# Patient Record
Sex: MALE | Employment: FULL TIME | URBAN - METROPOLITAN AREA
[De-identification: names, ages, dates, MRNs, and addresses within clinical notes are randomized per-mention and may not be internally consistent; named-entity substitution may affect disease eponyms.]

---

## 2017-11-25 ENCOUNTER — HOSPITAL ENCOUNTER (OUTPATIENT)
Age: 37
Discharge: HOME | End: 2017-11-25
Attending: FAMILY MEDICINE
Payer: COMMERCIAL

## 2017-11-25 VITALS
HEART RATE: 70 BPM | WEIGHT: 200 LBS | TEMPERATURE: 98 F | SYSTOLIC BLOOD PRESSURE: 130 MMHG | RESPIRATION RATE: 18 BRPM | DIASTOLIC BLOOD PRESSURE: 88 MMHG | OXYGEN SATURATION: 98 %

## 2017-11-25 DIAGNOSIS — J02.0 STREPTOCOCCAL SORE THROAT: Primary | ICD-10-CM

## 2017-11-25 PROCEDURE — 99214 OFFICE O/P EST MOD 30 MIN: CPT

## 2017-11-25 PROCEDURE — 87430 STREP A AG IA: CPT

## 2017-11-25 PROCEDURE — 99213 OFFICE O/P EST LOW 20 MIN: CPT

## 2017-11-25 RX ORDER — AMOXICILLIN 875 MG/1
875 TABLET, COATED ORAL 2 TIMES DAILY
Qty: 20 TABLET | Refills: 0 | Status: SHIPPED | OUTPATIENT
Start: 2017-11-25 | End: 2017-12-05

## 2017-11-25 NOTE — ED PROVIDER NOTES
Patient presents with:  Sore Throat      HPI:     Sneha Magdaleno is a 40year old male who presents with for chief complaint of nasal congestion, sore throat   X 2 days.     The patient denies complaints of fevers, chills, headache, neck pain, ear gretchen organomegaly    Skin: Skin color, texture, turgor normal. No rashes or lesions      Assessment/Plan:     Labs performed this visit:    Recent Results (from the past 10 hour(s))  -[facility] POCT RAPID STREP   Collection Time: 11/25/17  3:09 PM   Result Value Ref

## 2019-01-20 ENCOUNTER — HOSPITAL ENCOUNTER (OUTPATIENT)
Age: 39
Discharge: HOME | End: 2019-01-20
Attending: EMERGENCY MEDICINE
Payer: COMMERCIAL

## 2019-01-20 VITALS
SYSTOLIC BLOOD PRESSURE: 130 MMHG | TEMPERATURE: 99 F | DIASTOLIC BLOOD PRESSURE: 83 MMHG | OXYGEN SATURATION: 98 % | WEIGHT: 200 LBS | RESPIRATION RATE: 16 BRPM | HEART RATE: 104 BPM

## 2019-01-20 DIAGNOSIS — J02.0 STREP PHARYNGITIS: Primary | ICD-10-CM

## 2019-01-20 LAB — S PYO AG THROAT QL: POSITIVE

## 2019-01-20 PROCEDURE — 87430 STREP A AG IA: CPT

## 2019-01-20 PROCEDURE — 99214 OFFICE O/P EST MOD 30 MIN: CPT

## 2019-01-20 PROCEDURE — 99213 OFFICE O/P EST LOW 20 MIN: CPT

## 2019-01-20 RX ORDER — LOSARTAN POTASSIUM 50 MG/1
TABLET ORAL
COMMUNITY
Start: 2018-11-21

## 2019-01-20 RX ORDER — HYDROCHLOROTHIAZIDE 25 MG/1
TABLET ORAL
COMMUNITY
Start: 2018-11-21

## 2019-01-20 RX ORDER — AMOXICILLIN 875 MG/1
875 TABLET, COATED ORAL 2 TIMES DAILY
Qty: 20 TABLET | Refills: 0 | Status: SHIPPED | OUTPATIENT
Start: 2019-01-20 | End: 2019-01-30

## 2019-01-20 NOTE — ED PROVIDER NOTES
Patient Seen in: [facility] In [address] [place] [address]    History   Patient presents with:  Sore Throat    Stated Complaint: cough/sore throat/fever    HPI    Patient is a 25-year-old male who complains of sore throat and cough for the last 6 days normal. No respiratory distress. Abdominal: Soft. Bowel sounds are normal. Exhibits no distension and no mass. There is no tenderness. There is no rebound and no guarding. Musculoskeletal: Normal range of motion. Exhibits no edema or tenderness.    Lymp

## 2023-03-20 ENCOUNTER — APPOINTMENT (OUTPATIENT)
Dept: CT IMAGING | Facility: HOSPITAL | Age: 43
End: 2023-03-20
Attending: STUDENT IN AN ORGANIZED HEALTH CARE EDUCATION/TRAINING PROGRAM
Payer: COMMERCIAL

## 2023-03-20 ENCOUNTER — HOSPITAL ENCOUNTER (EMERGENCY)
Facility: HOSPITAL | Age: 43
Discharge: HOME | End: 2023-03-20
Attending: STUDENT IN AN ORGANIZED HEALTH CARE EDUCATION/TRAINING PROGRAM
Payer: COMMERCIAL

## 2023-03-20 VITALS
RESPIRATION RATE: 13 BRPM | OXYGEN SATURATION: 96 % | WEIGHT: 200 LBS | DIASTOLIC BLOOD PRESSURE: 133 MMHG | HEART RATE: 97 BPM | BODY MASS INDEX: 28 KG/M2 | HEIGHT: 71 IN | SYSTOLIC BLOOD PRESSURE: 188 MMHG | TEMPERATURE: 98 F

## 2023-03-20 DIAGNOSIS — R07.9 CHEST PAIN OF UNCERTAIN ETIOLOGY: Primary | ICD-10-CM

## 2023-03-20 LAB
ALBUMIN SERPL-MCNC: 4.8 G/DL (ref 3.4–5)
ALBUMIN/GLOB SERPL: 1.2 {RATIO} (ref 1–2)
ALP LIVER SERPL-CCNC: 68 U/L
ALT SERPL-CCNC: 87 U/L
ANION GAP SERPL CALC-SCNC: 10 MMOL/L (ref 0–18)
AST SERPL-CCNC: 40 U/L (ref 15–37)
ATRIAL RATE: 100 BPM
BASOPHILS # BLD AUTO: 0.05 X10(3) UL (ref 0–0.2)
BASOPHILS NFR BLD AUTO: 0.9 %
BILIRUB SERPL-MCNC: 1.1 MG/DL (ref 0.1–2)
BUN BLD-MCNC: 12 MG/DL (ref 7–18)
BUN/CREAT SERPL: 9.8 (ref 10–20)
CALCIUM BLD-MCNC: 9.8 MG/DL (ref 8.5–10.1)
CHLORIDE SERPL-SCNC: 104 MMOL/L (ref 98–112)
CO2 SERPL-SCNC: 26 MMOL/L (ref 21–32)
CREAT BLD-MCNC: 1.23 MG/DL
DEPRECATED RDW RBC AUTO: 38.7 FL (ref 35.1–46.3)
EOSINOPHIL # BLD AUTO: 0.11 X10(3) UL (ref 0–0.7)
EOSINOPHIL NFR BLD AUTO: 2 %
ERYTHROCYTE [DISTWIDTH] IN BLOOD BY AUTOMATED COUNT: 12.1 % (ref 11–15)
GFR SERPLBLD BASED ON 1.73 SQ M-ARVRAT: 75 ML/MIN/1.73M2 (ref 60–?)
GLOBULIN PLAS-MCNC: 3.9 G/DL (ref 2.8–4.4)
GLUCOSE BLD-MCNC: 132 MG/DL (ref 70–99)
HCT VFR BLD AUTO: 46.4 %
HGB BLD-MCNC: 16.1 G/DL
IMM GRANULOCYTES # BLD AUTO: 0.05 X10(3) UL (ref 0–1)
IMM GRANULOCYTES NFR BLD: 0.9 %
LYMPHOCYTES # BLD AUTO: 1.6 X10(3) UL (ref 1–4)
LYMPHOCYTES NFR BLD AUTO: 28.9 %
MCH RBC QN AUTO: 30.6 PG (ref 26–34)
MCHC RBC AUTO-ENTMCNC: 34.7 G/DL (ref 31–37)
MCV RBC AUTO: 88 FL
MONOCYTES # BLD AUTO: 0.54 X10(3) UL (ref 0.1–1)
MONOCYTES NFR BLD AUTO: 9.7 %
NEUTROPHILS # BLD AUTO: 3.19 X10 (3) UL (ref 1.5–7.7)
NEUTROPHILS # BLD AUTO: 3.19 X10(3) UL (ref 1.5–7.7)
NEUTROPHILS NFR BLD AUTO: 57.6 %
OSMOLALITY SERPL CALC.SUM OF ELEC: 292 MOSM/KG (ref 275–295)
P AXIS: 46 DEGREES
P-R INTERVAL: 166 MS
PLATELET # BLD AUTO: 181 10(3)UL (ref 150–450)
POTASSIUM SERPL-SCNC: 3.9 MMOL/L (ref 3.5–5.1)
PROT SERPL-MCNC: 8.7 G/DL (ref 6.4–8.2)
Q-T INTERVAL: 356 MS
QRS DURATION: 106 MS
QTC CALCULATION (BEZET): 459 MS
R AXIS: 46 DEGREES
RBC # BLD AUTO: 5.27 X10(6)UL
SODIUM SERPL-SCNC: 140 MMOL/L (ref 136–145)
T AXIS: -1 DEGREES
TROPONIN I HIGH SENSITIVITY: 10 NG/L
VENTRICULAR RATE: 100 BPM
WBC # BLD AUTO: 5.5 X10(3) UL (ref 4–11)

## 2023-03-20 PROCEDURE — 93005 ELECTROCARDIOGRAM TRACING: CPT

## 2023-03-20 PROCEDURE — 71275 CT ANGIOGRAPHY CHEST: CPT | Performed by: STUDENT IN AN ORGANIZED HEALTH CARE EDUCATION/TRAINING PROGRAM

## 2023-03-20 PROCEDURE — 84484 ASSAY OF TROPONIN QUANT: CPT | Performed by: STUDENT IN AN ORGANIZED HEALTH CARE EDUCATION/TRAINING PROGRAM

## 2023-03-20 PROCEDURE — 99285 EMERGENCY DEPT VISIT HI MDM: CPT

## 2023-03-20 PROCEDURE — 93010 ELECTROCARDIOGRAM REPORT: CPT

## 2023-03-20 PROCEDURE — 74175 CTA ABDOMEN W/CONTRAST: CPT | Performed by: STUDENT IN AN ORGANIZED HEALTH CARE EDUCATION/TRAINING PROGRAM

## 2023-03-20 PROCEDURE — 85025 COMPLETE CBC W/AUTO DIFF WBC: CPT | Performed by: STUDENT IN AN ORGANIZED HEALTH CARE EDUCATION/TRAINING PROGRAM

## 2023-03-20 PROCEDURE — 80053 COMPREHEN METABOLIC PANEL: CPT | Performed by: STUDENT IN AN ORGANIZED HEALTH CARE EDUCATION/TRAINING PROGRAM

## 2023-03-20 PROCEDURE — 99284 EMERGENCY DEPT VISIT MOD MDM: CPT

## 2023-03-20 PROCEDURE — 36415 COLL VENOUS BLD VENIPUNCTURE: CPT

## 2023-03-20 RX ORDER — AMLODIPINE BESYLATE 5 MG/1
5 TABLET ORAL DAILY
Qty: 30 TABLET | Refills: 0 | Status: SHIPPED | OUTPATIENT
Start: 2023-03-20 | End: 2023-04-19

## 2023-03-20 NOTE — ED INITIAL ASSESSMENT (HPI)
Patient ambulatory to ED with complaint of left sided chest pain that radiates to arm that started yesterday. Describes as dull pain. worsening with inspiration. Patient is AXOX4.

## 2023-03-20 NOTE — DISCHARGE INSTRUCTIONS
Please return to the emergency department if you develop severe and persistent chest pain,  difficulty breathing, dizziness, leg swelling or if you are coughing up blood as these can be signs  of a medical emergency. Please call your doctor for a follow up appointment in 2-3 days to  determine the need for further testing.

## 2023-03-21 ENCOUNTER — HOSPITAL ENCOUNTER (EMERGENCY)
Facility: HOSPITAL | Age: 43
Discharge: HOME | End: 2023-03-21
Attending: EMERGENCY MEDICINE
Payer: COMMERCIAL

## 2023-03-21 VITALS
OXYGEN SATURATION: 96 % | WEIGHT: 200 LBS | HEART RATE: 105 BPM | BODY MASS INDEX: 27.69 KG/M2 | DIASTOLIC BLOOD PRESSURE: 121 MMHG | RESPIRATION RATE: 17 BRPM | SYSTOLIC BLOOD PRESSURE: 164 MMHG | TEMPERATURE: 98 F | HEIGHT: 71.26 IN

## 2023-03-21 DIAGNOSIS — I10 HYPERTENSION, UNSPECIFIED TYPE: ICD-10-CM

## 2023-03-21 DIAGNOSIS — R07.89 CHEST PAIN, ATYPICAL: Primary | ICD-10-CM

## 2023-03-21 LAB
ATRIAL RATE: 119 BPM
CK SERPL-CCNC: 159 U/L
P AXIS: 22 DEGREES
P-R INTERVAL: 164 MS
Q-T INTERVAL: 342 MS
QRS DURATION: 100 MS
QTC CALCULATION (BEZET): 481 MS
R AXIS: 39 DEGREES
T AXIS: -1 DEGREES
TROPONIN I HIGH SENSITIVITY: 9 NG/L
TSI SER-ACNC: 1.08 MIU/ML (ref 0.36–3.74)
VENTRICULAR RATE: 119 BPM

## 2023-03-21 PROCEDURE — 99284 EMERGENCY DEPT VISIT MOD MDM: CPT

## 2023-03-21 PROCEDURE — 36415 COLL VENOUS BLD VENIPUNCTURE: CPT

## 2023-03-21 PROCEDURE — 93010 ELECTROCARDIOGRAM REPORT: CPT

## 2023-03-21 PROCEDURE — 83835 ASSAY OF METANEPHRINES: CPT | Performed by: EMERGENCY MEDICINE

## 2023-03-21 PROCEDURE — 84443 ASSAY THYROID STIM HORMONE: CPT | Performed by: EMERGENCY MEDICINE

## 2023-03-21 PROCEDURE — 84484 ASSAY OF TROPONIN QUANT: CPT | Performed by: EMERGENCY MEDICINE

## 2023-03-21 PROCEDURE — 82550 ASSAY OF CK (CPK): CPT | Performed by: EMERGENCY MEDICINE

## 2023-03-21 PROCEDURE — 93005 ELECTROCARDIOGRAM TRACING: CPT

## 2023-03-21 RX ORDER — DIAZEPAM 5 MG/ML
2 INJECTION, SOLUTION INTRAMUSCULAR; INTRAVENOUS ONCE
Status: DISCONTINUED | OUTPATIENT
Start: 2023-03-21 | End: 2023-03-21

## 2023-03-21 RX ORDER — HYDROCHLOROTHIAZIDE 25 MG/1
25 TABLET ORAL ONCE
Status: DISCONTINUED | OUTPATIENT
Start: 2023-03-21 | End: 2023-03-21

## 2023-03-21 RX ORDER — HYDROCHLOROTHIAZIDE 25 MG/1
25 TABLET ORAL DAILY
Qty: 30 TABLET | Refills: 0 | Status: SHIPPED | OUTPATIENT
Start: 2023-03-21 | End: 2023-04-20

## 2023-03-21 NOTE — DISCHARGE INSTRUCTIONS
Take medications as prescribed/scheduled - followup tomorrow as scheduled. Seek care for worsening symptoms.

## 2023-03-21 NOTE — ED INITIAL ASSESSMENT (HPI)
Pt was seen yesterday for CP and BP issues; was sent home. However today states BP on the rise again and slight palpations. Pt took BP meds that were prescribed from yesterday.  BP continues to be high and slight pressure in the chest.

## 2023-03-24 LAB
CREATININE, URINE - PER VOLUME: 77 MG/DL
METANEPHRINE, UR- RATIO TO CRT: 81 UG/G CRT
METANEPHRINE, URINE-PER VOLUME: 62 UG/L
METANEPHRINE: 0.4 NMOL/L
NORMETANEPHRINE, UR-PER VOLUME: 121 UG/L
NORMETANEPHRINE, URINE - RATIO: 157 UG/G CRT
NORMETANEPHRINE: 0.81 NMOL/L

## 2024-03-19 ENCOUNTER — APPOINTMENT (OUTPATIENT)
Dept: CT IMAGING | Age: 44
End: 2024-03-19
Attending: Physician Assistant
Payer: COMMERCIAL

## 2024-03-19 ENCOUNTER — HOSPITAL ENCOUNTER (INPATIENT)
Facility: HOSPITAL | Age: 44
LOS: 4 days | Discharge: HOME | End: 2024-03-25
Attending: HOSPITALIST | Admitting: HOSPITALIST
Payer: COMMERCIAL

## 2024-03-19 ENCOUNTER — HOSPITAL ENCOUNTER (OUTPATIENT)
Age: 44
Discharge: ANOTHER HOSPITAL | End: 2024-03-19
Payer: COMMERCIAL

## 2024-03-19 VITALS
SYSTOLIC BLOOD PRESSURE: 136 MMHG | TEMPERATURE: 99 F | HEART RATE: 110 BPM | DIASTOLIC BLOOD PRESSURE: 95 MMHG | OXYGEN SATURATION: 99 % | RESPIRATION RATE: 18 BRPM

## 2024-03-19 DIAGNOSIS — K37 APPENDICITIS: ICD-10-CM

## 2024-03-19 DIAGNOSIS — K35.30 ACUTE APPENDICITIS WITH LOCALIZED PERITONITIS, WITHOUT PERFORATION, ABSCESS, OR GANGRENE: Primary | ICD-10-CM

## 2024-03-19 DIAGNOSIS — K35.80 ACUTE APPENDICITIS, UNSPECIFIED ACUTE APPENDICITIS TYPE: Primary | ICD-10-CM

## 2024-03-19 LAB
#MXD IC: 1.6 X10ˆ3/UL (ref 0.1–1)
BILIRUB UR QL STRIP: NEGATIVE
BUN BLD-MCNC: 14 MG/DL (ref 7–18)
CHLORIDE BLD-SCNC: 96 MMOL/L (ref 98–112)
CLARITY UR: CLEAR
CO2 BLD-SCNC: 23 MMOL/L (ref 21–32)
COLOR UR: YELLOW
CREAT BLD-MCNC: 1.2 MG/DL
EGFRCR SERPLBLD CKD-EPI 2021: 77 ML/MIN/1.73M2 (ref 60–?)
GLUCOSE BLD-MCNC: 122 MG/DL (ref 70–99)
GLUCOSE UR STRIP-MCNC: NEGATIVE MG/DL
HCT VFR BLD AUTO: 43.6 %
HCT VFR BLD CALC: 50 %
HGB BLD-MCNC: 15.3 G/DL
HGB UR QL STRIP: NEGATIVE
ISTAT IONIZED CALCIUM FOR CHEM 8: 1.21 MMOL/L (ref 1.12–1.32)
KETONES UR STRIP-MCNC: NEGATIVE MG/DL
LEUKOCYTE ESTERASE UR QL STRIP: NEGATIVE
LYMPHOCYTES # BLD AUTO: 2.1 X10ˆ3/UL (ref 1–4)
LYMPHOCYTES NFR BLD AUTO: 9 %
MCH RBC QN AUTO: 30.1 PG (ref 26–34)
MCHC RBC AUTO-ENTMCNC: 35.1 G/DL (ref 31–37)
MCV RBC AUTO: 85.8 FL (ref 80–100)
MIXED CELL %: 7 %
NEUTROPHILS # BLD AUTO: 19.6 X10ˆ3/UL (ref 1.5–7.7)
NEUTROPHILS NFR BLD AUTO: 84 %
NITRITE UR QL STRIP: NEGATIVE
PH UR STRIP: 5.5 [PH]
PLATELET # BLD AUTO: 203 X10ˆ3/UL (ref 150–450)
POTASSIUM BLD-SCNC: 3.7 MMOL/L (ref 3.6–5.1)
PROT UR STRIP-MCNC: NEGATIVE MG/DL
RBC # BLD AUTO: 5.08 X10ˆ6/UL
SODIUM BLD-SCNC: 133 MMOL/L (ref 136–145)
SP GR UR STRIP: <=1.005
UROBILINOGEN UR STRIP-ACNC: <2 MG/DL
WBC # BLD AUTO: 23.3 X10ˆ3/UL (ref 4–11)

## 2024-03-19 PROCEDURE — 74177 CT ABD & PELVIS W/CONTRAST: CPT | Performed by: PHYSICIAN ASSISTANT

## 2024-03-19 PROCEDURE — 96375 TX/PRO/DX INJ NEW DRUG ADDON: CPT

## 2024-03-19 PROCEDURE — 99285 EMERGENCY DEPT VISIT HI MDM: CPT

## 2024-03-19 PROCEDURE — 96365 THER/PROPH/DIAG IV INF INIT: CPT

## 2024-03-19 RX ORDER — SODIUM CHLORIDE 9 MG/ML
INJECTION, SOLUTION INTRAVENOUS ONCE
Status: COMPLETED | OUTPATIENT
Start: 2024-03-19 | End: 2024-03-20

## 2024-03-19 RX ORDER — LISINOPRIL 40 MG/1
40 TABLET ORAL DAILY
COMMUNITY
Start: 2024-02-27

## 2024-03-19 RX ORDER — SODIUM CHLORIDE 9 MG/ML
INJECTION, SOLUTION INTRAVENOUS CONTINUOUS
Status: ACTIVE | OUTPATIENT
Start: 2024-03-19 | End: 2024-03-20

## 2024-03-19 RX ORDER — AMLODIPINE BESYLATE 5 MG/1
10 TABLET ORAL DAILY
COMMUNITY
Start: 2023-12-13

## 2024-03-19 RX ORDER — MORPHINE SULFATE 2 MG/ML
1 INJECTION, SOLUTION INTRAMUSCULAR; INTRAVENOUS EVERY 2 HOUR PRN
Status: DISCONTINUED | OUTPATIENT
Start: 2024-03-19 | End: 2024-03-23

## 2024-03-19 RX ORDER — AMLODIPINE BESYLATE 10 MG/1
10 TABLET ORAL DAILY
Status: DISCONTINUED | OUTPATIENT
Start: 2024-03-20 | End: 2024-03-25

## 2024-03-19 RX ORDER — BISACODYL 10 MG
10 SUPPOSITORY, RECTAL RECTAL
Status: DISCONTINUED | OUTPATIENT
Start: 2024-03-19 | End: 2024-03-25

## 2024-03-19 RX ORDER — SODIUM CHLORIDE 9 MG/ML
INJECTION, SOLUTION INTRAVENOUS CONTINUOUS
Status: DISCONTINUED | OUTPATIENT
Start: 2024-03-19 | End: 2024-03-23

## 2024-03-19 RX ORDER — MORPHINE SULFATE 4 MG/ML
4 INJECTION, SOLUTION INTRAMUSCULAR; INTRAVENOUS ONCE
Status: COMPLETED | OUTPATIENT
Start: 2024-03-19 | End: 2024-03-19

## 2024-03-19 RX ORDER — ONDANSETRON 2 MG/ML
4 INJECTION INTRAMUSCULAR; INTRAVENOUS EVERY 6 HOURS PRN
Status: DISCONTINUED | OUTPATIENT
Start: 2024-03-19 | End: 2024-03-25

## 2024-03-19 RX ORDER — METRONIDAZOLE 500 MG/100ML
500 INJECTION, SOLUTION INTRAVENOUS ONCE
Status: COMPLETED | OUTPATIENT
Start: 2024-03-19 | End: 2024-03-20

## 2024-03-19 RX ORDER — MORPHINE SULFATE 2 MG/ML
2 INJECTION, SOLUTION INTRAMUSCULAR; INTRAVENOUS EVERY 2 HOUR PRN
Status: DISCONTINUED | OUTPATIENT
Start: 2024-03-19 | End: 2024-03-23

## 2024-03-19 RX ORDER — POLYETHYLENE GLYCOL 3350 17 G/17G
17 POWDER, FOR SOLUTION ORAL DAILY PRN
Status: DISCONTINUED | OUTPATIENT
Start: 2024-03-19 | End: 2024-03-25

## 2024-03-19 RX ORDER — MORPHINE SULFATE 4 MG/ML
4 INJECTION, SOLUTION INTRAMUSCULAR; INTRAVENOUS EVERY 2 HOUR PRN
Status: DISCONTINUED | OUTPATIENT
Start: 2024-03-19 | End: 2024-03-23

## 2024-03-19 RX ORDER — SENNOSIDES 8.6 MG
17.2 TABLET ORAL NIGHTLY PRN
Status: DISCONTINUED | OUTPATIENT
Start: 2024-03-19 | End: 2024-03-25

## 2024-03-19 RX ORDER — PROCHLORPERAZINE EDISYLATE 5 MG/ML
5 INJECTION INTRAMUSCULAR; INTRAVENOUS EVERY 8 HOURS PRN
Status: DISCONTINUED | OUTPATIENT
Start: 2024-03-19 | End: 2024-03-21

## 2024-03-19 RX ORDER — METRONIDAZOLE 500 MG/100ML
500 INJECTION, SOLUTION INTRAVENOUS EVERY 8 HOURS
Status: DISCONTINUED | OUTPATIENT
Start: 2024-03-20 | End: 2024-03-22

## 2024-03-19 RX ORDER — ACETAMINOPHEN 500 MG
500 TABLET ORAL EVERY 4 HOURS PRN
Status: DISCONTINUED | OUTPATIENT
Start: 2024-03-19 | End: 2024-03-23

## 2024-03-19 NOTE — ED PROVIDER NOTES
Chief Complaint   Patient presents with    Abdomen/Flank Pain     History obtained from: patient   services not used     HPI:     Gómez Sinclair is a 44 year old male who presents with abdominal pain x 2 days.  Patient describes pain as constant localized to middle and lower abdomen without radiation.  Patient notes decreased appetite but continues to drink fluids.  Patient had a loose stool 2 days ago and has not had a bowel movement since.  Denies fever, chills, nausea, vomiting, diarrhea, constipation, blood in stool, urinary symptoms, flank pain.  Denies recent travel or sick contacts.  Remote history of hernia repair, otherwise no history of abdominal surgeries.    PMH  Past Medical History:   Diagnosis Date    Essential hypertension     Hyperlipidemia        PFSH    PFSH asessment screens reviewed and agree.  Nurses notes reviewed I agree with documentation.    No family history on file.  Family history reviewed with patient/caregiver and is not pertinent to presenting problem.  Social History     Socioeconomic History    Marital status:      Spouse name: Not on file    Number of children: Not on file    Years of education: Not on file    Highest education level: Not on file   Occupational History    Not on file   Tobacco Use    Smoking status: Never    Smokeless tobacco: Never   Substance and Sexual Activity    Alcohol use: Not on file    Drug use: Not on file    Sexual activity: Not on file   Other Topics Concern    Not on file   Social History Narrative    Not on file     Social Determinants of Health     Financial Resource Strain: Not on file   Food Insecurity: Not on file   Transportation Needs: Not on file   Physical Activity: Not on file   Stress: Not on file   Social Connections: Not on file   Housing Stability: Not on file         ROS:   Positive for stated complaint: Abdominal pain  All other systems reviewed and negative except as noted above.  Constitutional and Vital Signs  Reviewed.    Physical Exam:     Findings:    BP (!) 136/95   Pulse 110   Temp 99 °F (37.2 °C) (Temporal)   Resp 18   SpO2 99%   GENERAL: well developed, no acute distress, non-toxic appearing   SKIN: good skin turgor, no obvious rashes  HEAD: normocephalic, atraumatic  EYES: sclera non-icteric bilaterally, conjunctiva clear bilaterally  OROPHARYNX: MMM, pharynx clear, maintaining airway and secretions  NECK: no nuchal rigidity, no trismus, no edema, phonation normal    CARDIO: tachycardic, regular rhythm, normal heart sounds   LUNGS: clear to auscultation bilaterally, no increased WOB, no rales, rhonchi, or wheezes  GI: normoactive bowel sounds, abdomen soft, periumbilical and RLQ tenderness, no rebound tenderness or guarding   EXTREMITIES: no cyanosis or edema, LIANG without difficulty  NEURO: no focal deficits  PSYCH: alert and oriented x3, answering questions appropriately, mood appropriate    MDM/Assessment/Plan:   Orders for this encounter:    Orders Placed This Encounter    CT ABDOMEN+PELVIS(CONTRAST ONLY)(CPT=74177)     abd pain Pt presents to the IC with c/o        Order Specific Question:   If clinically indicated, CT Protocol includes Oral Contrast. Can Oral Contrast be administered?     Answer:   Yes     Order Specific Question:   What is the Relevant Clinical Indication / Reason for Exam?     Answer:   RLQ abdominal pain     Order Specific Question:   Release to patient     Answer:   Immediate    POCT CBC     Order Specific Question:   Release to patient     Answer:   Immediate    POCT Urinalysis Dipstick    POCT ISTAT chem8 cartridge    iStat (Chem 8)    POCT Urine Dip    Insert Peripheral IV    iopamidol 76% (ISOVUE-370) injection for power injector       Labs performed this visit:  Recent Results (from the past 10 hour(s))   POCT CBC    Collection Time: 03/19/24  5:24 PM   Result Value Ref Range    WBC IC 23.3 (H) 4.0 - 11.0 x10ˆ3/uL    RBC IC 5.08 4.30 - 5.70 X10ˆ6/uL    HGB IC 15.3 13.0 - 17.5  g/dL    HCT IC 43.6 39.0 - 53.0 %    MCV IC 85.8 80.0 - 100.0 fL    MCH IC 30.1 26.0 - 34.0 pg    MCHC IC 35.1 31.0 - 37.0 g/dL    PLT .0 150.0 - 450.0 X10ˆ3/uL    # Neutrophil 19.6 (H) 1.5 - 7.7 X10ˆ3/uL    # Lymphocyte 2.1 1.0 - 4.0 X10ˆ3/uL    # Mixed Cells 1.6 (H) 0.1 - 1.0 X10ˆ3/uL    Neutrophil % 84.0 %    Lymphocyte % 9.0 %    Mixed Cell % 7.0 %   POCT Urinalysis Dipstick    Collection Time: 03/19/24  5:27 PM   Result Value Ref Range    Urine Color Yellow Yellow    Urine Clarity Clear Clear    Specific Gravity, Urine <=1.005 1.005 - 1.030    PH, Urine 5.5 5.0 - 8.0    Protein urine Negative Negative mg/dL    Glucose, Urine Negative Negative mg/dL    Ketone, Urine Negative Negative mg/dL    Bilirubin, Urine Negative Negative    Blood, Urine Negative Negative    Nitrite Urine Negative Negative    Urobilinogen urine <2.0 <2.0 mg/dL    Leukocyte esterase urine Negative Negative   POCT ISTAT chem8 cartridge    Collection Time: 03/19/24  5:33 PM   Result Value Ref Range    ISTAT Sodium 133 (L) 136 - 145 mmol/L    ISTAT BUN 14 7 - 18 mg/dL    ISTAT Potassium 3.7 3.6 - 5.1 mmol/L    ISTAT Chloride 96 (L) 98 - 112 mmol/L    ISTAT Ionized Calcium 1.21 1.12 - 1.32 mmol/L    ISTAT Hematocrit 50 37 - 53 %    ISTAT Glucose 122 (H) 70 - 99 mg/dL    ISTAT TCO2 23 21 - 32 mmol/L    ISTAT Creatinine 1.20 0.70 - 1.30 mg/dL    eGFR-Cr 77 >=60 mL/min/1.73m2       Imaging performed this visit:  CT ABDOMEN+PELVIS(CONTRAST ONLY)(CPT=74177)   Final Result   PROCEDURE: CT ABDOMEN + PELVIS (CONTRAST ONLY) (CPT=74177)       COMPARISON: [facility], CTA CHEST+CTA ABDOMEN DISSECT SET    (CPT=71275/72539), 3/20/2023, 9:22 AM.       INDICATIONS: RLQ abdominal pain       TECHNIQUE: CT images of the abdomen and pelvis were obtained with    non-ionic intravenous contrast material.  Automated exposure control for    dose reduction was used. Adjustment of the mA and/or kV was done based on    the patient's size. Use of  iterative    reconstruction technique for dose reduction was used.  Dose information is    transmitted to the ACR (American College of Radiology) NRDR (National    Radiology Data Registry) which includes the Dose Index Registry.       FINDINGS:    LOWER CHEST: Minimal bibasilar atelectasis.  No coronary artery    calcifications.  The heart is within normal limits of size.  No    pericardial effusion.       HEPATOBILIARY:   There is hepatic steatosis.  The liver is unchanged in    size.  No focal hepatic lesion.  No acute cholecystitis.  No intrahepatic    or extrahepatic biliary ductal dilatation.       SPLEEN:   No enlargement or focal lesion.         PANCREAS:   No lesion, fluid collection, ductal dilatation, or atrophy.         ADRENALS:   No mass or enlargement.         GENITOURINARY:   No hydronephrosis or urinary calculus.  There is fat    stranding adjacent to the right mid distal ureter.  No enhancing renal    mass.  Multiple bilateral renal cysts.  The bladder is unremarkable.       GI TRACT:    Acute uncomplicated appendicitis.  The appendix is dilated    and measure up to 1.7 cm in diameter (2:95).  There is a 10 mm    appendicolith in the appendiceal base (2:94).  There is diffuse fat    stranding surrounding the appendix.  No drainable    fluid collection.  No pneumoperitoneum.  No bowel obstruction.  No    abnormal small or large bowel wall thickening.  There is colonic    diverticulosis without evidence of acute diverticulitis.       PELVIC ORGANS: The prostate measures 4.3 cm in the transverse plane.       AORTA/VASCULAR:   No aneurysm or dissection.        RETROPERITONEUM:   No mass or enlarged adenopathy.         PERITONEUM: Small volume of free fluid in the pelvis (2:113).       LYMPH NODES: No evidence of lymphadenopathy.  There are few nonenlarged    right lower quadrant lymph nodes, which are likely reactive.       BONES:  No acute fracture. No aggressive osseous lesion.  Grade 1     anterolisthesis of L5 on S1.  There are bilateral L5 pars defects.  Mild    degenerative change of the lower lumbar spine.       OTHER:   Negative.                         =====   CONCLUSION:        1. Acute uncomplicated appendicitis.  Recommend surgical consultation.   2. No bowel obstruction, drainable fluid collection, or pneumoperitoneum.   3. Small volume of free fluid in the pelvis, which is likely secondary to    above.   4. Hepatic steatosis.   5. Mild prostatomegaly.   6. Small hiatal hernia.   7. Bilateral L5 pars defects with associated grade 1 anterolisthesis of L5    on S1 and lower lumbar predominant degenerative change.   8. Lesser incidental findings described above.                 Dictated by (CST): Home Kingston MD on 3/19/2024 at 6:43 PM        Finalized by (CST): Home Kingston MD on 3/19/2024 at 6:48 PM                   Medical Decision Making  DDx includes gastritis versus appendicitis versus constipation versus bowel obstruction versus diverticulitis versus other.  Patient is overall well-appearing, tachycardic however states he is feeling anxious about being at the doctor.  Vital signs otherwise stable.  CBC reviewed, notable for leukocytosis of 23.3 and neutrophilia of 19.6.  BMP reviewed, notable for mild hyponatremia of 133, otherwise grossly unremarkable without further electrolyte derangements or kidney dysfunction.  Urine dipstick analysis reviewed, grossly unremarkable without evidence of infection or blood.  Patient sent to [facility] for CT abdomen pelvis with contrast.    1850: CT reviewed, acute appendicitis noted, no bowel obstruction, small free fluid in the pelvis, other incidental findings as per above.  Discussed these results with patient via phone.  Discussed case with [facility] and [facility] transfer center, no beds available for direct admission.  Instructed patient to go directly to [facility].  Patient verbalizes understanding and declines EMS  transport.  IV in place to left AC.    Discussed case with Dr. Dietrich who is in agreement with assessment and plan.  Discussed case with [facility] charge RN who is aware of patient coming.           Diagnosis:    ICD-10-CM    1. Acute appendicitis, unspecified acute appendicitis type  K35.80             Becky Loja PA-C

## 2024-03-20 ENCOUNTER — ANESTHESIA EVENT (OUTPATIENT)
Dept: SURGERY | Facility: HOSPITAL | Age: 44
End: 2024-03-20
Payer: COMMERCIAL

## 2024-03-20 ENCOUNTER — ANESTHESIA (OUTPATIENT)
Dept: SURGERY | Facility: HOSPITAL | Age: 44
End: 2024-03-20
Payer: COMMERCIAL

## 2024-03-20 LAB
ANION GAP SERPL CALC-SCNC: 7 MMOL/L (ref 0–18)
BASOPHILS # BLD AUTO: 0.05 X10(3) UL (ref 0–0.2)
BASOPHILS NFR BLD AUTO: 0.2 %
BUN BLD-MCNC: 12 MG/DL (ref 9–23)
BUN/CREAT SERPL: 10.4 (ref 10–20)
CALCIUM BLD-MCNC: 9.6 MG/DL (ref 8.7–10.4)
CHLORIDE SERPL-SCNC: 102 MMOL/L (ref 98–112)
CO2 SERPL-SCNC: 24 MMOL/L (ref 21–32)
CREAT BLD-MCNC: 1.15 MG/DL
DEPRECATED RDW RBC AUTO: 39.8 FL (ref 35.1–46.3)
EGFRCR SERPLBLD CKD-EPI 2021: 81 ML/MIN/1.73M2 (ref 60–?)
EOSINOPHIL # BLD AUTO: 0.01 X10(3) UL (ref 0–0.7)
EOSINOPHIL NFR BLD AUTO: 0 %
ERYTHROCYTE [DISTWIDTH] IN BLOOD BY AUTOMATED COUNT: 12.3 % (ref 11–15)
GLUCOSE BLD-MCNC: 118 MG/DL (ref 70–99)
HCT VFR BLD AUTO: 43.8 %
HGB BLD-MCNC: 14.7 G/DL
IMM GRANULOCYTES # BLD AUTO: 0.23 X10(3) UL (ref 0–1)
IMM GRANULOCYTES NFR BLD: 0.9 %
LACTATE SERPL-SCNC: 1.5 MMOL/L (ref 0.5–2)
LYMPHOCYTES # BLD AUTO: 1.18 X10(3) UL (ref 1–4)
LYMPHOCYTES NFR BLD AUTO: 4.8 %
MCH RBC QN AUTO: 29.3 PG (ref 26–34)
MCHC RBC AUTO-ENTMCNC: 33.6 G/DL (ref 31–37)
MCV RBC AUTO: 87.4 FL
MONOCYTES # BLD AUTO: 1.93 X10(3) UL (ref 0.1–1)
MONOCYTES NFR BLD AUTO: 7.8 %
NEUTROPHILS # BLD AUTO: 21.27 X10 (3) UL (ref 1.5–7.7)
NEUTROPHILS # BLD AUTO: 21.27 X10(3) UL (ref 1.5–7.7)
NEUTROPHILS NFR BLD AUTO: 86.3 %
OSMOLALITY SERPL CALC.SUM OF ELEC: 277 MOSM/KG (ref 275–295)
PLATELET # BLD AUTO: 215 10(3)UL (ref 150–450)
POTASSIUM SERPL-SCNC: 3.9 MMOL/L (ref 3.5–5.1)
RBC # BLD AUTO: 5.01 X10(6)UL
SODIUM SERPL-SCNC: 133 MMOL/L (ref 136–145)
WBC # BLD AUTO: 24.7 X10(3) UL (ref 4–11)

## 2024-03-20 PROCEDURE — 87205 SMEAR GRAM STAIN: CPT | Performed by: COLON & RECTAL SURGERY

## 2024-03-20 PROCEDURE — 80048 BASIC METABOLIC PNL TOTAL CA: CPT | Performed by: HOSPITALIST

## 2024-03-20 PROCEDURE — 87077 CULTURE AEROBIC IDENTIFY: CPT | Performed by: COLON & RECTAL SURGERY

## 2024-03-20 PROCEDURE — 87040 BLOOD CULTURE FOR BACTERIA: CPT | Performed by: HOSPITALIST

## 2024-03-20 PROCEDURE — 85025 COMPLETE CBC W/AUTO DIFF WBC: CPT | Performed by: HOSPITALIST

## 2024-03-20 PROCEDURE — 87070 CULTURE OTHR SPECIMN AEROBIC: CPT | Performed by: COLON & RECTAL SURGERY

## 2024-03-20 PROCEDURE — 0DTJ4ZZ RESECTION OF APPENDIX, PERCUTANEOUS ENDOSCOPIC APPROACH: ICD-10-PCS | Performed by: COLON & RECTAL SURGERY

## 2024-03-20 PROCEDURE — 83605 ASSAY OF LACTIC ACID: CPT | Performed by: HOSPITALIST

## 2024-03-20 PROCEDURE — 96376 TX/PRO/DX INJ SAME DRUG ADON: CPT

## 2024-03-20 PROCEDURE — 88304 TISSUE EXAM BY PATHOLOGIST: CPT | Performed by: COLON & RECTAL SURGERY

## 2024-03-20 RX ORDER — DEXAMETHASONE SODIUM PHOSPHATE 4 MG/ML
VIAL (ML) INJECTION AS NEEDED
Status: DISCONTINUED | OUTPATIENT
Start: 2024-03-20 | End: 2024-03-20 | Stop reason: SURG

## 2024-03-20 RX ORDER — LIDOCAINE HYDROCHLORIDE 10 MG/ML
INJECTION, SOLUTION EPIDURAL; INFILTRATION; INTRACAUDAL; PERINEURAL AS NEEDED
Status: DISCONTINUED | OUTPATIENT
Start: 2024-03-20 | End: 2024-03-20 | Stop reason: SURG

## 2024-03-20 RX ORDER — MORPHINE SULFATE 4 MG/ML
2 INJECTION, SOLUTION INTRAMUSCULAR; INTRAVENOUS EVERY 10 MIN PRN
Status: DISCONTINUED | OUTPATIENT
Start: 2024-03-20 | End: 2024-03-20 | Stop reason: HOSPADM

## 2024-03-20 RX ORDER — BUPIVACAINE HYDROCHLORIDE 2.5 MG/ML
INJECTION, SOLUTION EPIDURAL; INFILTRATION; INTRACAUDAL AS NEEDED
Status: DISCONTINUED | OUTPATIENT
Start: 2024-03-20 | End: 2024-03-20 | Stop reason: HOSPADM

## 2024-03-20 RX ORDER — HYDROMORPHONE HYDROCHLORIDE 1 MG/ML
0.2 INJECTION, SOLUTION INTRAMUSCULAR; INTRAVENOUS; SUBCUTANEOUS EVERY 5 MIN PRN
Status: DISCONTINUED | OUTPATIENT
Start: 2024-03-20 | End: 2024-03-20 | Stop reason: HOSPADM

## 2024-03-20 RX ORDER — MIDAZOLAM HYDROCHLORIDE 1 MG/ML
INJECTION INTRAMUSCULAR; INTRAVENOUS AS NEEDED
Status: DISCONTINUED | OUTPATIENT
Start: 2024-03-20 | End: 2024-03-20 | Stop reason: SURG

## 2024-03-20 RX ORDER — ROCURONIUM BROMIDE 10 MG/ML
INJECTION, SOLUTION INTRAVENOUS AS NEEDED
Status: DISCONTINUED | OUTPATIENT
Start: 2024-03-20 | End: 2024-03-20 | Stop reason: SURG

## 2024-03-20 RX ORDER — MORPHINE SULFATE 4 MG/ML
4 INJECTION, SOLUTION INTRAMUSCULAR; INTRAVENOUS EVERY 10 MIN PRN
Status: DISCONTINUED | OUTPATIENT
Start: 2024-03-20 | End: 2024-03-20 | Stop reason: HOSPADM

## 2024-03-20 RX ORDER — LABETALOL HYDROCHLORIDE 5 MG/ML
INJECTION, SOLUTION INTRAVENOUS AS NEEDED
Status: DISCONTINUED | OUTPATIENT
Start: 2024-03-20 | End: 2024-03-20 | Stop reason: SURG

## 2024-03-20 RX ORDER — OXYCODONE HYDROCHLORIDE 5 MG/1
5 TABLET ORAL EVERY 4 HOURS PRN
Status: DISCONTINUED | OUTPATIENT
Start: 2024-03-20 | End: 2024-03-25

## 2024-03-20 RX ORDER — SODIUM CHLORIDE, SODIUM LACTATE, POTASSIUM CHLORIDE, CALCIUM CHLORIDE 600; 310; 30; 20 MG/100ML; MG/100ML; MG/100ML; MG/100ML
INJECTION, SOLUTION INTRAVENOUS CONTINUOUS
Status: DISCONTINUED | OUTPATIENT
Start: 2024-03-20 | End: 2024-03-20 | Stop reason: HOSPADM

## 2024-03-20 RX ORDER — HYDROMORPHONE HYDROCHLORIDE 1 MG/ML
0.6 INJECTION, SOLUTION INTRAMUSCULAR; INTRAVENOUS; SUBCUTANEOUS EVERY 5 MIN PRN
Status: DISCONTINUED | OUTPATIENT
Start: 2024-03-20 | End: 2024-03-20 | Stop reason: HOSPADM

## 2024-03-20 RX ORDER — MORPHINE SULFATE 10 MG/ML
6 INJECTION, SOLUTION INTRAMUSCULAR; INTRAVENOUS EVERY 10 MIN PRN
Status: DISCONTINUED | OUTPATIENT
Start: 2024-03-20 | End: 2024-03-20 | Stop reason: HOSPADM

## 2024-03-20 RX ORDER — ONDANSETRON 2 MG/ML
INJECTION INTRAMUSCULAR; INTRAVENOUS AS NEEDED
Status: DISCONTINUED | OUTPATIENT
Start: 2024-03-20 | End: 2024-03-20 | Stop reason: SURG

## 2024-03-20 RX ORDER — HYDROMORPHONE HYDROCHLORIDE 1 MG/ML
0.4 INJECTION, SOLUTION INTRAMUSCULAR; INTRAVENOUS; SUBCUTANEOUS EVERY 5 MIN PRN
Status: DISCONTINUED | OUTPATIENT
Start: 2024-03-20 | End: 2024-03-20 | Stop reason: HOSPADM

## 2024-03-20 RX ORDER — NALOXONE HYDROCHLORIDE 0.4 MG/ML
0.08 INJECTION, SOLUTION INTRAMUSCULAR; INTRAVENOUS; SUBCUTANEOUS AS NEEDED
Status: DISCONTINUED | OUTPATIENT
Start: 2024-03-20 | End: 2024-03-20 | Stop reason: HOSPADM

## 2024-03-20 RX ORDER — SODIUM CHLORIDE, SODIUM LACTATE, POTASSIUM CHLORIDE, CALCIUM CHLORIDE 600; 310; 30; 20 MG/100ML; MG/100ML; MG/100ML; MG/100ML
INJECTION, SOLUTION INTRAVENOUS CONTINUOUS PRN
Status: DISCONTINUED | OUTPATIENT
Start: 2024-03-20 | End: 2024-03-20 | Stop reason: SURG

## 2024-03-20 RX ADMIN — DEXAMETHASONE SODIUM PHOSPHATE 4 MG: 4 MG/ML VIAL (ML) INJECTION at 11:43:00

## 2024-03-20 RX ADMIN — LABETALOL HYDROCHLORIDE 5 MG: 5 INJECTION, SOLUTION INTRAVENOUS at 12:49:00

## 2024-03-20 RX ADMIN — SODIUM CHLORIDE, SODIUM LACTATE, POTASSIUM CHLORIDE, CALCIUM CHLORIDE: 600; 310; 30; 20 INJECTION, SOLUTION INTRAVENOUS at 11:50:00

## 2024-03-20 RX ADMIN — LABETALOL HYDROCHLORIDE 5 MG: 5 INJECTION, SOLUTION INTRAVENOUS at 13:32:00

## 2024-03-20 RX ADMIN — SODIUM CHLORIDE, SODIUM LACTATE, POTASSIUM CHLORIDE, CALCIUM CHLORIDE: 600; 310; 30; 20 INJECTION, SOLUTION INTRAVENOUS at 12:58:00

## 2024-03-20 RX ADMIN — ROCURONIUM BROMIDE 10 MG: 10 INJECTION, SOLUTION INTRAVENOUS at 12:20:00

## 2024-03-20 RX ADMIN — SODIUM CHLORIDE: 9 INJECTION, SOLUTION INTRAVENOUS at 11:34:00

## 2024-03-20 RX ADMIN — LIDOCAINE HYDROCHLORIDE 50 MG: 10 INJECTION, SOLUTION EPIDURAL; INFILTRATION; INTRACAUDAL; PERINEURAL at 11:37:00

## 2024-03-20 RX ADMIN — ROCURONIUM BROMIDE 10 MG: 10 INJECTION, SOLUTION INTRAVENOUS at 12:47:00

## 2024-03-20 RX ADMIN — ROCURONIUM BROMIDE 10 MG: 10 INJECTION, SOLUTION INTRAVENOUS at 12:22:00

## 2024-03-20 RX ADMIN — ROCURONIUM BROMIDE 40 MG: 10 INJECTION, SOLUTION INTRAVENOUS at 11:38:00

## 2024-03-20 RX ADMIN — MIDAZOLAM HYDROCHLORIDE 2 MG: 1 INJECTION INTRAMUSCULAR; INTRAVENOUS at 11:36:00

## 2024-03-20 RX ADMIN — LABETALOL HYDROCHLORIDE 5 MG: 5 INJECTION, SOLUTION INTRAVENOUS at 13:20:00

## 2024-03-20 RX ADMIN — ONDANSETRON 8 MG: 2 INJECTION INTRAMUSCULAR; INTRAVENOUS at 13:17:00

## 2024-03-20 RX ADMIN — SODIUM CHLORIDE, SODIUM LACTATE, POTASSIUM CHLORIDE, CALCIUM CHLORIDE: 600; 310; 30; 20 INJECTION, SOLUTION INTRAVENOUS at 13:20:00

## 2024-03-20 RX ADMIN — SODIUM CHLORIDE, SODIUM LACTATE, POTASSIUM CHLORIDE, CALCIUM CHLORIDE: 600; 310; 30; 20 INJECTION, SOLUTION INTRAVENOUS at 13:34:00

## 2024-03-20 NOTE — ED QUICK NOTES
Pt to ED from [facility] with c/o right sided abdominal pain, intermittent nausea, and decreased appetite since Sunday. Pt denies fall or trauma. Pt denies fever. Pt denies chest pain or sob. No respiratory distress noted. Pt is alert and oriented x4. Pt skin parameters WNL. Pt ambulating by self with steady gait. Pt denies diarrhea or constipation. RLQ tenderness noted. IV established to LAC #[id number] at [facility] prior to arrival to ED. IVF and antibiotics infusing per order. Will continue to monitor.

## 2024-03-20 NOTE — CONSULTS
[facility]  part of [facility]    Report of Consultation    Gómez Sinclair Patient Status:  Observation    1980 MRN [id number]   Location [facility] PRE OP RECOVERY Attending Zan Sebastian MD   Hosp Day # 0 PCP No primary care provider on file.     Date of Admission:  3/19/2024  Date of Consult:  3/20/2024  Reason for Consultation:   Appendicitis    History of Present Illness:   Patient is a 44 year old male who was admitted to the hospital for Acute appendicitis with localized peritonitis, without perforation, abscess, or gangrene:    He developed abdominal pain on 3/17/2024.  Pain began periumbilical and migrated to RLQ over several hours.  Pain has been fairly constant, better when laying flat.  He has not had pain with walking or in car ride here last night.  He has anorexia but denies N/V, change in bowel or bladder habits, or respiratory symptoms.  He had a low grade fever last night to 100.2.    He has had bilateral inguinal hernia repair at age 3, no other surgeried.    Past Medical History  Past Medical History:   Diagnosis Date    Essential hypertension     Hyperlipidemia        Past Surgical History  Past Surgical History:   Procedure Laterality Date    REPAIR ING HERNIA,5+Y/O,REDUCIBL         Family History  No family history on file.    Social History  Social History     Socioeconomic History    Marital status:    Tobacco Use    Smoking status: Never    Smokeless tobacco: Never   Substance and Sexual Activity    Alcohol use: Not Currently    Drug use: Never     Social Determinants of Health     Food Insecurity: No Food Insecurity (3/19/2024)    Food Insecurity     Food Insecurity: Never true   Transportation Needs: No Transportation Needs (3/19/2024)    Transportation Needs     Lack of Transportation: No   Housing Stability: Low Risk  (3/19/2024)    Housing Stability     Housing Instability: No           Current Medications:  Current  Facility-Administered Medications   Medication Dose Route Frequency    cefTRIAXone (Rocephin) 1 g in D5W 100 mL IVPB-ADD  1 g Intravenous Q24H    metRONIDAZOLE in sodium chloride 0.79% (Flagyl) 5 mg/mL IVPB premix 500 mg  500 mg Intravenous Q8H    sodium chloride 0.9% infusion   Intravenous Continuous    [MAR Hold] acetaminophen (Tylenol Extra Strength) tab 500 mg  500 mg Oral Q4H PRN    [MAR Hold] morphINE PF 2 MG/ML injection 1 mg  1 mg Intravenous Q2H PRN    Or    [MAR Hold] morphINE PF 2 MG/ML injection 2 mg  2 mg Intravenous Q2H PRN    Or    [MAR Hold] morphINE PF 4 MG/ML injection 4 mg  4 mg Intravenous Q2H PRN    [MAR Hold] ondansetron (Zofran) 4 MG/2ML injection 4 mg  4 mg Intravenous Q6H PRN    [MAR Hold] prochlorperazine (Compazine) 10 MG/2ML injection 5 mg  5 mg Intravenous Q8H PRN    [MAR Hold] polyethylene glycol (PEG 3350) (Miralax) 17 g oral packet 17 g  17 g Oral Daily PRN    [MAR Hold] sennosides (Senokot) tab 17.2 mg  17.2 mg Oral Nightly PRN    [MAR Hold] bisacodyl (Dulcolax) 10 MG rectal suppository 10 mg  10 mg Rectal Daily PRN    [MAR Hold] amLODIPine (Norvasc) tab 10 mg  10 mg Oral Daily     Medications Prior to Admission   Medication Sig    amLODIPine 5 MG Oral Tab Take 2 tablets (10 mg total) by mouth daily.    lisinopril 40 MG Oral Tab Take 1 tablet (40 mg total) by mouth daily.       Allergies  No Known Allergies    Review of Systems:   Pertinent items are noted in HPI.  A comprehensive review of systems was negative except for: see HPI    Physical Exam:      /89 (BP Location: Right arm)   Pulse 108   Temp 100.2 °F (37.9 °C) (Oral)   Resp 16   Ht 5' 11\" (1.803 m)   Wt 202 lb 9.6 oz (91.9 kg)   SpO2 97%   BMI 28.26 kg/m²    Body mass index is 28.26 kg/m².    I/O last 3 completed shifts:  In: 200 [P.O.:100; IV PIGGYBACK:100]  Out: -     CONSTITUTIONAL:  awake, alert, cooperative, no apparent distress, and appears stated age  EYES:  Lids and lashes normal, sclera clear,  conjunctiva normal  ENT:  Normocephalic, without obvious abnormality, atraumatic  NECK:  Supple, symmetrical, trachea midline, no adenopathy, thyroid symmetric, not enlarged and no tenderness  HEMATOLOGIC/LYMPHATICS:  No cervical lymphadenopathy, no supraclavicular lymphadenopathy, no inguinal lymphadenopathy  LUNGS:  No increased work of breathing, good air exchange, clear to auscultation bilaterally, no crackles or wheezing  CARDIOVASCULAR:  Normal apical impulse, regular rate and rhythm, and no murmur noted, no pedal edema  ABDOMEN:    Bilateral inguinal scars,   normal bowel sounds, soft, non-distended,   Tender in RLQ with mild percussion tenderness but no pain with heel thrust or pelvic shake  no masses palpated, no hepatosplenomegaly      MUSCULOSKELETAL: Full range of motion noted.  Motor strength is 5 out of 5 all extremities bilaterally.   SKIN:  no rashes and no jaundice  PSYCHIATRIC:       Orientation:  normal     Appearance:  normal     Behavior:  normal     Attitude toward examiner:  normal     Affect: normal     Judgment:  Normal         Results:     Laboratory Data:  Lab Results   Component Value Date    WBC 24.7 (H) 03/20/2024    HGB 14.7 03/20/2024    HCT 43.8 03/20/2024    .0 03/20/2024    CREATSERUM 1.15 03/20/2024    BUN 12 03/20/2024     (L) 03/20/2024    K 3.9 03/20/2024     03/20/2024    CO2 24.0 03/20/2024     (H) 03/20/2024    CA 9.6 03/20/2024    ALB 4.8 03/20/2023    ALKPHO 68 03/20/2023    TP 8.7 (H) 03/20/2023    AST 40 (H) 03/20/2023    ALT 87 (H) 03/20/2023    TSH 1.080 03/21/2023     03/21/2023         Imaging:  CT ABDOMEN+PELVIS(CONTRAST ONLY)(CPT=74177)    Result Date: 3/19/2024  CONCLUSION:   1. Acute uncomplicated appendicitis.  Recommend surgical consultation. 2. No bowel obstruction, drainable fluid collection, or pneumoperitoneum. 3. Small volume of free fluid in the pelvis, which is likely secondary to above. 4. Hepatic steatosis. 5. Mild  prostatomegaly. 6. Small hiatal hernia. 7. Bilateral L5 pars defects with associated grade 1 anterolisthesis of L5 on S1 and lower lumbar predominant degenerative change. 8. Lesser incidental findings described above.     Dictated by (CST): Home Kingston MD on 3/19/2024 at 6:43 PM     Finalized by (CST): Home Kingston MD on 3/19/2024 at 6:48 PM              Impression:     Acute appendicitis with localized peritonitis, without perforation, abscess, or gangrene  History, exam and CT all concur  Appears to be early, not perforated    He has been started on IV antibiotics, Rocephin and Flagyl    Recommendations:  Laparoscopic appendectomy, possible open scheduled for this morning    Procedure, indications, risks, benefits, and alternatives discussed with patient and family. All questions answered. They understand and je wishes to proceed.      Thank you for allowing me to participate in the care of your patient.    Arnoldo Diaz MD    3/20/2024

## 2024-03-20 NOTE — ANESTHESIA POSTPROCEDURE EVALUATION
Patient: Gómez Sinclair    Procedure Summary       Date: 03/20/24 Room / Location: [facility] MAIN OR  / [facility] MAIN OR    Anesthesia Start: 1134 Anesthesia Stop: 1345    Procedure: LAPAROSCOPIC APPENDECTOMY (Abdomen) Diagnosis:       Appendicitis      (Appendicitis [K37])    Surgeons: Arnoldo Diaz MD Anesthesiologist: Joaquina Bishop MD    Anesthesia Type: general ASA Status: 2            Anesthesia Type: general    Vitals Value Taken Time   /98 03/20/24 1345   Temp 99.3 °F (37.4 °C) 03/20/24 1345   Pulse 100 03/20/24 1345   Resp 16 03/20/24 1345   SpO2 92 % 03/20/24 1345   Vitals shown include unfiled device data.    EM AN Post Evaluation:   Patient Evaluated in PACU  Patient Participation: waiting for patient participation  Level of Consciousness: sleepy but conscious  Pain Management: adequate  Airway Patency:patent  Dental exam unchanged from preop  Yes    Nausea/Vomiting: none  Cardiovascular Status: hemodynamically stable  Respiratory Status: acceptable and nasal cannula  Postoperative Hydration acceptable    Juan Antonio Alvarado CRNA  3/20/2024 1:46 PM

## 2024-03-20 NOTE — ANESTHESIA PREPROCEDURE EVALUATION
Anesthesia PreOp Note    HPI:     Gómez Sinclair is a 44 year old male who presents for preoperative consultation requested by: Arnoldo Diaz MD    Date of Surgery: 3/19/2024 - 3/20/2024    Procedure(s):  LAPAROSCOPIC APPENDECTOMY POSSIBLE OPEN  Indication: Appendicitis [K37]    Relevant Problems   No relevant active problems       NPO:  Last Liquid Consumption Date: 03/19/24 (sip H2O with med)  Last Liquid Consumption Time: 2230  Last Solid Consumption Date: 03/19/24  Last Solid Consumption Time: 1000  Last Liquid Consumption Date: 03/19/24 (sip H2O with med)          History Review:  Patient Active Problem List    Diagnosis Date Noted    Acute appendicitis with localized peritonitis, without perforation, abscess, or gangrene 03/19/2024       Past Medical History:   Diagnosis Date    Essential hypertension     Hyperlipidemia        Past Surgical History:   Procedure Laterality Date    REPAIR ING HERNIA,5+Y/O,REDUCIBL         Medications Prior to Admission   Medication Sig Dispense Refill Last Dose    amLODIPine 5 MG Oral Tab Take 2 tablets (10 mg total) by mouth daily.   3/19/2024    lisinopril 40 MG Oral Tab Take 1 tablet (40 mg total) by mouth daily.   3/19/2024     Current Facility-Administered Medications Ordered in Epic   Medication Dose Route Frequency Provider Last Rate Last Admin    cefTRIAXone (Rocephin) 1 g in D5W 100 mL IVPB-ADD  1 g Intravenous Q24H Nirmala Aceves MD        metRONIDAZOLE in sodium chloride 0.79% (Flagyl) 5 mg/mL IVPB premix 500 mg  500 mg Intravenous Q8H Nirmala Aceves  mL/hr at 03/20/24 0559 500 mg at 03/20/24 0559    sodium chloride 0.9% infusion   Intravenous Continuous Nirmala Aceves MD 83 mL/hr at 03/20/24 0559 New Bag at 03/20/24 0559    [MAR Hold] acetaminophen (Tylenol Extra Strength) tab 500 mg  500 mg Oral Q4H PRN Nirmala Aceves MD   500 mg at 03/20/24 0802    [MAR Hold] morphINE PF 2 MG/ML injection 1 mg  1 mg Intravenous Q2H PRN Nirmala Aceves MD         Or    [MAR Hold] morphINE PF 2 MG/ML injection 2 mg  2 mg Intravenous Q2H PRN Nirmala Aceves MD        Or    [MAR Hold] morphINE PF 4 MG/ML injection 4 mg  4 mg Intravenous Q2H PRN Nirmala Aceves MD   4 mg at 03/20/24 0802    [MAR Hold] ondansetron (Zofran) 4 MG/2ML injection 4 mg  4 mg Intravenous Q6H PRN Nirmala Aceves MD        [MAR Hold] prochlorperazine (Compazine) 10 MG/2ML injection 5 mg  5 mg Intravenous Q8H PRN Nirmala Aceves MD        [MAR Hold] polyethylene glycol (PEG 3350) (Miralax) 17 g oral packet 17 g  17 g Oral Daily PRN Nirmala Aceves MD        [MAR Hold] sennosides (Senokot) tab 17.2 mg  17.2 mg Oral Nightly PRNirmala Yang MD        [MAR Hold] bisacodyl (Dulcolax) 10 MG rectal suppository 10 mg  10 mg Rectal Daily PRN Nirmala Aceves MD        [MAR Hold] amLODIPine (Norvasc) tab 10 mg  10 mg Oral Daily Nirmala Aceves MD   10 mg at 03/20/24 0802     No current [facility]-ordered outpatient medications on file.       No Known Allergies    No family history on file.  Social History     Socioeconomic History    Marital status:    Tobacco Use    Smoking status: Never    Smokeless tobacco: Never   Substance and Sexual Activity    Alcohol use: Not Currently    Drug use: Never       Available pre-op labs reviewed.  Lab Results   Component Value Date    WBC 24.7 (H) 03/20/2024    RBC 5.01 03/20/2024    HGB 14.7 03/20/2024    HCT 43.8 03/20/2024    MCV 87.4 03/20/2024    MCH 29.3 03/20/2024    MCHC 33.6 03/20/2024    RDW 12.3 03/20/2024    .0 03/20/2024     Lab Results   Component Value Date     (L) 03/20/2024    K 3.9 03/20/2024     03/20/2024    CO2 24.0 03/20/2024    BUN 12 03/20/2024    CREATSERUM 1.15 03/20/2024     (H) 03/20/2024    CA 9.6 03/20/2024          Vital Signs:  Body mass index is 28.26 kg/m².   height is 1.803 m (5' 11\") and weight is 91.9 kg (202 lb 9.6 oz). His oral temperature is 100.2 °F (37.9 °C). His blood pressure is 141/89  and his pulse is 108. His respiration is 16 and oxygen saturation is 97%.   Vitals:    03/19/24 2215 03/20/24 0521 03/20/24 0558 03/20/24 0832   BP: 135/90 (!) 137/91 (!) 141/94 141/89   Pulse: 93 116 116 108   Resp: 20 18 19 16   Temp: 99.8 °F (37.7 °C) 99 °F (37.2 °C) 99.9 °F (37.7 °C) 100.2 °F (37.9 °C)   TempSrc: Oral Temporal Oral Oral   SpO2: 97%  93% 97%   Weight: 91.9 kg (202 lb 9.6 oz)      Height: 1.803 m (5' 11\")           Anesthesia Evaluation     Patient summary reviewed and Nursing notes reviewed    No history of anesthetic complications   Airway   Mallampati: I  TM distance: >3 FB  Neck ROM: full  Dental      Pulmonary - negative ROS and normal exam   Cardiovascular - normal exam  (+) hypertension    Neuro/Psych - negative ROS     GI/Hepatic/Renal      Comments: appendicitis    Endo/Other - negative ROS   Abdominal  - normal exam                 Anesthesia Plan:   ASA:  2  Plan:   General  Informed Consent Plan and Risks Discussed With:  Patient      I have informed Gómez Sinclair and/or legal guardian or family member of the nature of the anesthetic plan, benefits, risks including possible dental damage if relevant, major complications, and any alternative forms of anesthetic management.   All of the patient's questions were answered to the best of my ability. The patient desires the anesthetic management as planned.  Joaquina Bishop MD  3/20/2024 10:27 AM  Present on Admission:  **None**

## 2024-03-20 NOTE — OPERATIVE REPORT
Operative Note    Patient Name: Gómez Sinclair    Date of Surgery: 03/20/24     Preoperative Diagnosis: Appendicitis [K37]    Postoperative Diagnosis: same    Primary Surgeon: Arnoldo Diaz MD    Assistant: Mr. Immanuel Mondragon    Procedures: Laparoscopic cecectomy, drainage peritoneal abscess    Surgical Findings: perforated appendicitis with purulent fluid in pelvis and between bowel loops, extensive inflammatory reaction involving base of cecum and mesoappendix, partial cecectomy     Specimen:   1) Intraperitoneal abscess  2) appendix    Anesthesia: General    Complications: none    Implants: none    Drains: none    Condition: good    Estimated Blood Loss: 25 mL    DESCRIPTION OF PROCEDURE    INDICATION   The patient is a 44 year old male with acute appendicitis.    The procedure, indications, risks, benefits, and alternatives were discussed with the patient prior to the procedure. All questions were answered, and the patient wished to proceed.    TECHNIQUE  The patient was taken to the operating room and placed supine on the operating table. General anesthesia was induced. The abdomen was prepared with Chloraprep and draped in the usual sterile fashion. Timeout was called to reconfirm the patient's identity, diagnosis, planned procedure, and completeness of preoperative preparations.    The procedure began with placement of a Tere cannula at the umbilicus using an open Tere technique. A vertical infraumbilical incision was made. Fascia was clearly identified and sharply entered. Care was made to lift the fascia away from the abdomen, thus leaving the bowel protected below it. Excellent visualization was achieved when entering the abdomen. An 0 Vicryl stitch was placed in a pursestring fashion around the fascial opening at the umbilicus. Once the Tere port was placed, pneumoperitoneum was achieved. The abdomen was insufflated up to 15 mmHg. The abdomen was then inspected. Overall, the abdomen was  grossly normal except for the right lower quadrant where there was clearly an inflammatory response at the base of the cecum. A 5 mm port was placed in the right upper quadrant under direct visualization and there was no damage to the bowel underneath or any bleeding coming through the abdominal wall once the port was placed. A 5 mm port was placed in the left lower quadrant, again under direct visualization without any damage to surrounding bowel or any bleeding noted in the abdominal wall with placement of the port. An additional 5 mm port was needed and placed in the LUQ later in the procedure.    Padded graspers were then inserted through the above mentioned ports, and the appendix was bluntly dissected from the surrounding adherent structures. The appendix itself was extremely inflamed and adherent to multiple loops of bowel. This was bluntly dissected and released an interloop abscess which was cultured and then suctioned and irrigated. The appendix itself was perforated and demonstrated an inflamed appearance at the base of the cecum.  The base was identified going into the cecum as evidenced by also finding the terminal ileum entering the cecum that was separate and clear from the appendix. The appendiceal stump was not suitable to staple closed and a cecectomy was required. The right colon was partially mobilized by incising along the white line of Toldt. A window was made between the appendix and the mesentery at the base. Two firings of the 45 mm purple endoGIA stapler were then used to isolate and staple off the cecum from the ascending colon. The staple line was inspected for bleeding. None was noted to be found. Attention was then turned to the mesoappendix. This was very inflamed and not amenable to stapling to divide. We dissected through the mesentery and clipped the appendiceal artery. There was no evidence of bleeding. The appendix was placed in the EndoCatch bag and secured. Saline irrigation was  used to flush and irrigate the pelvis, right lower quadrant and above the liver. Attention was then turned back to the cecum. Again, the staple line looked excellent with excellent hemostasis and a nice seal where the appendix was taken as well as no bleeding at the mesenteric portion as well.     The abdomen was desufflated. The appendix was removed in its entirety in the EndoCatch bag.     Once the procedure was completed, the abdomen was evacuated of all gas. Attention was then turned to closing our port sites. The previously placed 0 Vicryl stitch at the umbilical was tied down with care not to involve any bowel or omentum below, and this demonstrated no hernia once it was secured. Skin was then closed in a subcuticular fashion at each of the 3 incisions with 4-0 Vicryl. Skin was covered with Dermabond    He tolerated the procedure well. Sponge, needle and instrument counts were correct x 2 at the end of the case. I was present for the entirety of the case.    ( Immanuel Alanna's skilled assistance was necessary for patient positioning, prepping, instrument passing and holding, retraction, suturing, and camera holding.)      _____________________________________   Attending Surgeon: Arnoldo Diaz MD   Dictated By: Arnoldo Diaz MD

## 2024-03-20 NOTE — PLAN OF CARE
Stated had not spoken with surgeon yet. Abdomen swollen and painful, morphine given. Up with standby assist to bathroom. Denied nausea. NPO for surgery planned for 10:20, BP med okay to give with sip preop per attending MD. CHG given. Family at bedside.     Coming back from PACU soon.     1549- Post lap appe with 4 lap sites dermabond open to air. Tender. Morphine given for pain. Clear liquids post op started.       1822- Tolerating scant sips water, has not ordered any juice or jello or anything yet, no appetite. Morphine given for pain , refused tablet just yet, not able just yet. No emesis. Voided in bathroom.  Up with walker into bathroom. SCDs. Dc plan is home.     Problem: Patient Centered Care  Goal: Patient preferences are identified and integrated in the patient's plan of care  Description: Interventions:  - What would you like us to know as we care for you? From home with family  - Provide timely, complete, and accurate information to patient/family  - Incorporate patient and family knowledge, values, beliefs, and cultural backgrounds into the planning and delivery of care  - Encourage patient/family to participate in care and decision-making at the level they choose  - Honor patient and family perspectives and choices  Outcome: Progressing     Problem: Patient/Family Goals  Goal: Patient/Family Long Term Goal  Description: Patient's Long Term Goal: Recover and return home    Interventions:  - DC planning in progress  - See additional Care Plan goals for specific interventions  Outcome: Progressing  Goal: Patient/Family Short Term Goal  Description: Patient's Short Term Goal: Pain <5/10    Interventions:   - medicated per MD orders for pain  - See additional Care Plan goals for specific interventions  Outcome: Progressing     Problem: PAIN - ADULT  Goal: Verbalizes/displays adequate comfort level or patient's stated pain goal  Description: INTERVENTIONS:  - Encourage pt to monitor pain and request  assistance  - Assess pain using appropriate pain scale  - Administer analgesics based on type and severity of pain and evaluate response  - Implement non-pharmacological measures as appropriate and evaluate response  - Consider cultural and social influences on pain and pain management  - Manage/alleviate anxiety  - Utilize distraction and/or relaxation techniques  - Monitor for opioid side effects  - Notify MD/LIP if interventions unsuccessful or patient reports new pain  - Anticipate increased pain with activity and pre-medicate as appropriate  Outcome: Progressing     Problem: DISCHARGE PLANNING  Goal: Discharge to home or other facility with appropriate resources  Description: INTERVENTIONS:  - Identify barriers to discharge w/pt and caregiver  - Include patient/family/discharge partner in discharge planning  - Arrange for needed discharge resources and transportation as appropriate  - Identify discharge learning needs (meds, wound care, etc)  - Arrange for interpreters to assist at discharge as needed  - Consider post-discharge preferences of patient/family/discharge partner  - Complete POLST form as appropriate  - Assess patient's ability to be responsible for managing their own health  - Refer to Case Management Department for coordinating discharge planning if the patient needs post-hospital services based on physician/LIP order or complex needs related to functional status, cognitive ability or social support system  Outcome: Progressing

## 2024-03-20 NOTE — BRIEF OP NOTE
Pre-Operative Diagnosis: Appendicitis [K37]     Post-Operative Diagnosis: Appendicitis [K37]      Procedure Performed:   LAPAROSCOPIC APPENDECTOMY    Surgeon(s) and Role:     * Arnoldo Diaz MD - Primary    Assistant(s):  Surgical Assistant.: Immanuel Mondragon     Surgical Findings: perforated appendicitis with purulent fluid in pelvis and between bowel loops, extensive inflammatory reaction involving base of cecum and mesoappendix, partial cecectomy     Specimen:   Intraperitoneal abscess  appendix     Estimated Blood Loss: Blood Output: 25 mL (3/20/2024  1:07 PM)        Arnoldo Diaz MD  3/20/2024  1:16 PM

## 2024-03-20 NOTE — PLAN OF CARE
Arrived from ED. AO x4. Pain to right side of abdomen. Morphine PRN for pain. IVF running. NPO since midnight. Plan for lap appy today with Dr. Diaz. Voiding, up to bathroom. Ambulating self in room. Last bowel movement 3/18/24. Call light within reach. Fall precautions.   Problem: Patient Centered Care  Goal: Patient preferences are identified and integrated in the patient's plan of care  Description: Interventions:  - What would you like us to know as we care for you? From home with family  - Provide timely, complete, and accurate information to patient/family  - Incorporate patient and family knowledge, values, beliefs, and cultural backgrounds into the planning and delivery of care  - Encourage patient/family to participate in care and decision-making at the level they choose  - Honor patient and family perspectives and choices  Outcome: Progressing     Problem: Patient/Family Goals  Goal: Patient/Family Long Term Goal  Description: Patient's Long Term Goal:     Interventions:  -   - See additional Care Plan goals for specific interventions  Outcome: Progressing  Goal: Patient/Family Short Term Goal  Description: Patient's Short Term Goal:     Interventions:   -   - See additional Care Plan goals for specific interventions  Outcome: Progressing     Problem: PAIN - ADULT  Goal: Verbalizes/displays adequate comfort level or patient's stated pain goal  Description: INTERVENTIONS:  - Encourage pt to monitor pain and request assistance  - Assess pain using appropriate pain scale  - Administer analgesics based on type and severity of pain and evaluate response  - Implement non-pharmacological measures as appropriate and evaluate response  - Consider cultural and social influences on pain and pain management  - Manage/alleviate anxiety  - Utilize distraction and/or relaxation techniques  - Monitor for opioid side effects  - Notify MD/LIP if interventions unsuccessful or patient reports new pain  - Anticipate increased  pain with activity and pre-medicate as appropriate  Outcome: Progressing     Problem: DISCHARGE PLANNING  Goal: Discharge to home or other facility with appropriate resources  Description: INTERVENTIONS:  - Identify barriers to discharge w/pt and caregiver  - Include patient/family/discharge partner in discharge planning  - Arrange for needed discharge resources and transportation as appropriate  - Identify discharge learning needs (meds, wound care, etc)  - Arrange for interpreters to assist at discharge as needed  - Consider post-discharge preferences of patient/family/discharge partner  - Complete POLST form as appropriate  - Assess patient's ability to be responsible for managing their own health  - Refer to Case Management Department for coordinating discharge planning if the patient needs post-hospital services based on physician/LIP order or complex needs related to functional status, cognitive ability or social support system  Outcome: Progressing

## 2024-03-20 NOTE — ED QUICK NOTES
Transport at bedside. Patient aware of plan of care, verbalizes understanding.   Brought to floor with belongings.

## 2024-03-20 NOTE — H&P
AL Hospitalist H&P       CC:   Chief Complaint   Patient presents with    Abdomen/Flank Pain        PCP: No primary care provider on file.    History of Present Illness: Patient is a 44 year old male with PMH sig for HTN, HLD, who presents with abdominal pain. Pain stared in the RLQ 3 days ago without radiation but worsening over the last 3 days.  Has nausea but no vomiting or diarrhea or dysuria.  Denies any chest pain, shortness of breath, no fevers or chills, no headache or dizziness.  Wife, mother and father at bedside.     CT at [facility] with perforated appendicitis   In the ED afebrile and hypertensive   Leukocytosis       PMH  Past Medical History:   Diagnosis Date    Essential hypertension     Hyperlipidemia         PSH  Past Surgical History:   Procedure Laterality Date    REPAIR ING HERNIA,5+Y/O,REDUCIBL          ALL:  No Known Allergies     Home Medications:  Outpatient Medications Marked as Taking for the 3/19/24 encounter (Hospital Encounter)   Medication Sig Dispense Refill    amLODIPine 5 MG Oral Tab Take 2 tablets (10 mg total) by mouth daily.      lisinopril 40 MG Oral Tab Take 1 tablet (40 mg total) by mouth daily.           Soc Hx  Social History     Tobacco Use    Smoking status: Never    Smokeless tobacco: Never   Substance Use Topics    Alcohol use: Not Currently        Fam Hx  No family history on file.    Review of Systems  Comprehensive ROS reviewed and negative except for what's stated above.     OBJECTIVE:  BP (!) 141/94 (BP Location: Right arm)   Pulse 116   Temp 99.9 °F (37.7 °C) (Oral)   Resp 19   Ht 5' 11\" (1.803 m)   Wt 202 lb 9.6 oz (91.9 kg)   SpO2 93%   BMI 28.26 kg/m²   General: Alert, no acute distress  HEENT: oral mucosa normal   Neck: non tender, no adenopathy   Lungs: clear to ausculation bilaterally  Heart: Regular rate and rhythm  Abdomen: soft, tender, non distended   Extremities: No edema  Skin: no new rash, normal color  Neuro: 5/5 strength in bilateral extremities,  normal sensations  Psych: appropriate affect   Diagnostic Data:    CBC/Chem  Recent Labs   Lab 03/19/24  1724 03/20/24  0348   WBC  --  24.7*   HGB  --  14.7   MCV 85.8 87.4   PLT  --  215.0       Recent Labs   Lab 03/20/24  0348   *   K 3.9      CO2 24.0   BUN 12   CREATSERUM 1.15   *   CA 9.6       No results for input(s): \"ALT\", \"AST\", \"ALB\", \"AMYLASE\", \"LIPASE\", \"LDH\" in the last 168 hours.    Invalid input(s): \"ALPHOS\", \"TBIL\", \"DBIL\", \"TPROT\"    No results for input(s): \"TROP\" in the last 168 hours.    Radiology: CT ABDOMEN+PELVIS(CONTRAST ONLY)(CPT=74177)    Result Date: 3/19/2024  CONCLUSION:   1. Acute uncomplicated appendicitis.  Recommend surgical consultation. 2. No bowel obstruction, drainable fluid collection, or pneumoperitoneum. 3. Small volume of free fluid in the pelvis, which is likely secondary to above. 4. Hepatic steatosis. 5. Mild prostatomegaly. 6. Small hiatal hernia. 7. Bilateral L5 pars defects with associated grade 1 anterolisthesis of L5 on S1 and lower lumbar predominant degenerative change. 8. Lesser incidental findings described above.     Dictated by (CST): Home Kingston MD on 3/19/2024 at 6:43 PM     Finalized by (CST): Home Kingston MD on 3/19/2024 at 6:48 PM             ASSESSMENT / PLAN:   Patient is a 44 year old male with PMH sig for HTN, HLD, who presents with abdominal pain.  Admitted from [facility] for acute appendicitis.    Perforated appendicitis   Leukocytosis  - RLQ pain, with nausea   - no fevers   - CT reviewed   - Surgery consulted  - NPO, IVF, ceftriaxone, flagyl   - pain, nausea control     Hyponatremia   - likely from decreased PO intake for the last 3 days  - 0.9  - trend Na     HTN - amlodipine, hold lisinopril   HLD  - not on statin (diet and exercise)     FN:  - IVF: 0.9  - Diet: npo    DVT Prophy: SCDs  Atrophy: Ambulate   Lines: Piv    Dispo: pending clinical course    Outpatient records or previous hospital records reviewed.     Further  recommendations pending patient's clinical course.  [facility] to continue to follow patient while in house    Patient and/or patient's family given opportunity to ask questions and note understanding and agreeing with therapeutic plan as outlined    Thank You,  Zan Sebastian MD    [facility]  Answering Service number: [phone]

## 2024-03-20 NOTE — ED PROVIDER NOTES
Patient Seen in: [facility] Emergency Department      History   No chief complaint on file.    Stated Complaint: abd pain    Subjective:   45yo/m w hx of HTN reports to the ED w co RLQ pain since Sunday. Worse w time. Pain, nausea. No vomiting. No trauma. Remote hernia repair as a child. No recent abx use, travel. No urinary symptoms. Was seen at the immediate care with marked leukocytosis and a non perforated appendicitis on CT.             Objective:   Past Medical History:   Diagnosis Date    Essential hypertension     Hyperlipidemia               Past Surgical History:   Procedure Laterality Date    REPAIR ING HERNIA,5+Y/O,REDUCIBL                  Social History     Socioeconomic History    Marital status:    Tobacco Use    Smoking status: Never    Smokeless tobacco: Never   Substance and Sexual Activity    Alcohol use: Not Currently    Drug use: Never              Review of Systems   All other systems reviewed and are negative.      Positive for stated complaint: abd pain  Other systems are as noted in HPI.  Constitutional and vital signs reviewed.      All other systems reviewed and negative except as noted above.    Physical Exam     ED Triage Vitals [03/19/24 2011]   BP (!) 145/101   Pulse (!) 122   Resp 20   Temp 98.9 °F (37.2 °C)   Temp src Oral   SpO2 97 %   O2 Device None (Room air)       Current:BP (!) 153/106   Pulse 111   Temp 98.9 °F (37.2 °C) (Oral)   Resp 20   Wt 90.7 kg   SpO2 96%   BMI 27.69 kg/m²         Physical Exam  Vitals and nursing note reviewed.   Constitutional:       General: He is not in acute distress.     Appearance: He is well-developed.   HENT:      Head: Normocephalic and atraumatic.      Nose: Nose normal.      Mouth/Throat:      Mouth: Mucous membranes are moist.   Eyes:      Conjunctiva/sclera: Conjunctivae normal.      Pupils: Pupils are equal, round, and reactive to light.   Cardiovascular:      Rate and Rhythm: Normal rate and regular rhythm.       Heart sounds: Normal heart sounds.   Pulmonary:      Effort: Pulmonary effort is normal.      Breath sounds: Normal breath sounds.   Abdominal:      General: Bowel sounds are normal.      Palpations: Abdomen is soft.      Tenderness: There is abdominal tenderness.   Musculoskeletal:         General: No tenderness or deformity. Normal range of motion.      Cervical back: Normal range of motion and neck supple.   Skin:     General: Skin is warm and dry.      Capillary Refill: Capillary refill takes less than 2 seconds.      Findings: No rash.      Comments: Normal color   Neurological:      General: No focal deficit present.      Mental Status: He is alert and oriented to person, place, and time.      GCS: GCS eye subscore is 4. GCS verbal subscore is 5. GCS motor subscore is 6.      Cranial Nerves: No cranial nerve deficit.      Gait: Gait normal.              ED Course   Labs Reviewed - No data to display          Impression   CONCLUSION:     1. Acute uncomplicated appendicitis.  Recommend surgical consultation.  2. No bowel obstruction, drainable fluid collection, or pneumoperitoneum.  3. Small volume of free fluid in the pelvis, which is likely secondary to above.  4. Hepatic steatosis.  5. Mild prostatomegaly.  6. Small hiatal hernia.  7. Bilateral L5 pars defects with associated grade 1 anterolisthesis of L5 on S1 and lower lumbar predominant degenerative change.  8. Lesser incidental findings described above.                 [facility]        Admission disposition: 3/19/2024  9:11 PM                            Medical Decision Making  45yo/m w hx and exam as stated c/o appendicitis, dx at immediate care    Afebrile  No vomiting  +leukocytosis  Pain improved with meds  No perforation    Plan  [place]  Patient is a [facility] primary care patient  Discussed with Dr. Diaz, will consult  Discussed with Dr. Aceves will admit      Amount and/or Complexity of Data Reviewed  Labs:  Decision-making details documented in  ED Course.  Radiology:  Decision-making details documented in ED Course.    Risk  OTC drugs.  Prescription drug management.        Disposition and Plan     Clinical Impression:  1. Acute appendicitis with localized peritonitis, without perforation, abscess, or gangrene         Disposition:  Admit  3/19/2024  9:11 pm    Follow-up:  No follow-up provider specified.        Medications Prescribed:  Current Discharge Medication List                            Hospital Problems

## 2024-03-20 NOTE — ANESTHESIA PROCEDURE NOTES
Airway  Date/Time: 3/20/2024 11:40 AM  Urgency: Elective    Airway not difficult    General Information and Staff    Patient location during procedure: OR  Anesthesiologist: Joaquina Bishop MD  Resident/CRNA: Juan Antonio Alvarado CRNA  Performed: CRNA   Performed by: Juan Antonio Alvarado CRNA  Authorized by: Joaquina Bishop MD      Indications and Patient Condition  Indications for airway management: anesthesia  Spontaneous Ventilation: absent  Sedation level: deep  Preoxygenated: yes  Patient position: sniffing  Mask difficulty assessment: 2 - vent by mask + OA or adjuvant +/- NMBA    Final Airway Details  Final airway type: endotracheal airway      Successful airway: ETT  Cuffed: yes   Successful intubation technique: direct laryngoscopy  Facilitating devices/methods: intubating stylet and tooth guard  Endotracheal tube insertion site: oral  Blade: Long  Blade size: #3  ETT size (mm): 7.5    Cormack-Lehane Classification: grade I - full view of glottis  Placement verified by: capnometry   Cuff volume (mL): 9  Measured from: teeth  ETT to teeth (cm): 23  Number of attempts at approach: 1

## 2024-03-20 NOTE — ED QUICK NOTES
Orders for admission, patient is aware of plan and ready to go upstairs. Any questions, please call ED RN foster at extension [phone].     Patient Covid vaccination status: Unvaccinated     COVID Test Ordered in ED: None    COVID Suspicion at Admission: N/A    Running Infusions:  None    Mental Status/LOC at time of transport: aox4    Other pertinent information:   CIWA score: N/A   NIH score:  N/A

## 2024-03-21 LAB
ALBUMIN SERPL-MCNC: 4.5 G/DL (ref 3.2–4.8)
ALBUMIN/GLOB SERPL: 1.5 {RATIO} (ref 1–2)
ALP LIVER SERPL-CCNC: 58 U/L
ALT SERPL-CCNC: 14 U/L
ANION GAP SERPL CALC-SCNC: 6 MMOL/L (ref 0–18)
AST SERPL-CCNC: 13 U/L (ref ?–34)
BASOPHILS # BLD AUTO: 0.04 X10(3) UL (ref 0–0.2)
BASOPHILS NFR BLD AUTO: 0.2 %
BILIRUB SERPL-MCNC: 1.2 MG/DL (ref 0.3–1.2)
BUN BLD-MCNC: 16 MG/DL (ref 9–23)
BUN/CREAT SERPL: 14.8 (ref 10–20)
C DIFF TOX B STL QL: NEGATIVE
CALCIUM BLD-MCNC: 9.5 MG/DL (ref 8.7–10.4)
CHLORIDE SERPL-SCNC: 104 MMOL/L (ref 98–112)
CO2 SERPL-SCNC: 23 MMOL/L (ref 21–32)
CREAT BLD-MCNC: 1.08 MG/DL
DEPRECATED RDW RBC AUTO: 39.1 FL (ref 35.1–46.3)
EGFRCR SERPLBLD CKD-EPI 2021: 87 ML/MIN/1.73M2 (ref 60–?)
EOSINOPHIL # BLD AUTO: 0 X10(3) UL (ref 0–0.7)
EOSINOPHIL NFR BLD AUTO: 0 %
ERYTHROCYTE [DISTWIDTH] IN BLOOD BY AUTOMATED COUNT: 12.4 % (ref 11–15)
GLOBULIN PLAS-MCNC: 3.1 G/DL (ref 2.8–4.4)
GLUCOSE BLD-MCNC: 145 MG/DL (ref 70–99)
HCT VFR BLD AUTO: 41.9 %
HGB BLD-MCNC: 15.3 G/DL
IMM GRANULOCYTES # BLD AUTO: 0.3 X10(3) UL (ref 0–1)
IMM GRANULOCYTES NFR BLD: 1.2 %
LYMPHOCYTES # BLD AUTO: 1.16 X10(3) UL (ref 1–4)
LYMPHOCYTES NFR BLD AUTO: 4.7 %
MAGNESIUM SERPL-MCNC: 2.3 MG/DL (ref 1.6–2.6)
MCH RBC QN AUTO: 31.6 PG (ref 26–34)
MCHC RBC AUTO-ENTMCNC: 36.5 G/DL (ref 31–37)
MCV RBC AUTO: 86.6 FL
MONOCYTES # BLD AUTO: 1.57 X10(3) UL (ref 0.1–1)
MONOCYTES NFR BLD AUTO: 6.4 %
NEUTROPHILS # BLD AUTO: 21.57 X10 (3) UL (ref 1.5–7.7)
NEUTROPHILS # BLD AUTO: 21.57 X10(3) UL (ref 1.5–7.7)
NEUTROPHILS NFR BLD AUTO: 87.5 %
OSMOLALITY SERPL CALC.SUM OF ELEC: 280 MOSM/KG (ref 275–295)
PLATELET # BLD AUTO: 184 10(3)UL (ref 150–450)
POTASSIUM SERPL-SCNC: 4.1 MMOL/L (ref 3.5–5.1)
PROT SERPL-MCNC: 7.6 G/DL (ref 5.7–8.2)
RBC # BLD AUTO: 4.84 X10(6)UL
SODIUM SERPL-SCNC: 133 MMOL/L (ref 136–145)
WBC # BLD AUTO: 24.6 X10(3) UL (ref 4–11)

## 2024-03-21 PROCEDURE — 80053 COMPREHEN METABOLIC PANEL: CPT | Performed by: COLON & RECTAL SURGERY

## 2024-03-21 PROCEDURE — 83735 ASSAY OF MAGNESIUM: CPT | Performed by: COLON & RECTAL SURGERY

## 2024-03-21 PROCEDURE — 85025 COMPLETE CBC W/AUTO DIFF WBC: CPT | Performed by: COLON & RECTAL SURGERY

## 2024-03-21 PROCEDURE — 87493 C DIFF AMPLIFIED PROBE: CPT | Performed by: INTERNAL MEDICINE

## 2024-03-21 RX ORDER — METOCLOPRAMIDE 5 MG/1
5 TABLET ORAL 3 TIMES DAILY PRN
Status: DISCONTINUED | OUTPATIENT
Start: 2024-03-21 | End: 2024-03-21

## 2024-03-21 RX ORDER — METOCLOPRAMIDE HYDROCHLORIDE 5 MG/ML
5 INJECTION INTRAMUSCULAR; INTRAVENOUS EVERY 6 HOURS
Status: DISCONTINUED | OUTPATIENT
Start: 2024-03-21 | End: 2024-03-22

## 2024-03-21 RX ORDER — LISINOPRIL 40 MG/1
40 TABLET ORAL DAILY
Status: DISCONTINUED | OUTPATIENT
Start: 2024-03-21 | End: 2024-03-25

## 2024-03-21 NOTE — PROGRESS NOTES
[facility]  part of [facility]    Progress Note    Gómez Sinclair Patient Status:  Observation    1980 MRN [id number]   Location [facility] 4W/SW/SE Attending Zan Sebastian MD   Hosp Day # 0 PCP No primary care provider on file.        Subjective:   Gómez Sinclair is a(n) 44 year old male     POD#1 s/p laparoscopic appendectomy and drainage of intraperitoneal abscess  Pain decreased compared to preoperative pain  Low grade temp overnight  Feels bloated and belching frequently  No BM/flatus  Ambulating and voiding              Allergies/Medications:   Allergies: No Known Allergies   lisinopril (Prinivil; Zestril) tab 40 mg  40 mg Oral Daily    metoclopramide (Reglan) 5 mg/mL injection 5 mg  5 mg Intravenous Q6H    oxyCODONE immediate release tab 5 mg  5 mg Oral Q4H PRN    [COMPLETED] iopamidol 76% (ISOVUE-370) injection for power injector  85 mL Intravenous ONCE PRN    [COMPLETED] cefTRIAXone (Rocephin) 1 g in D5W 100 mL IVPB-ADD  1 g Intravenous Once    [COMPLETED] metRONIDAZOLE in sodium chloride 0.79% (Flagyl) 5 mg/mL IVPB premix 500 mg  500 mg Intravenous Once    [COMPLETED] morphINE PF 4 MG/ML injection 4 mg  4 mg Intravenous Once    [] sodium chloride 0.9% infusion   Intravenous Continuous    [COMPLETED] sodium chloride 0.9% infusion   Intravenous Once    cefTRIAXone (Rocephin) 1 g in D5W 100 mL IVPB-ADD  1 g Intravenous Q24H    metRONIDAZOLE in sodium chloride 0.79% (Flagyl) 5 mg/mL IVPB premix 500 mg  500 mg Intravenous Q8H    sodium chloride 0.9% infusion   Intravenous Continuous    acetaminophen (Tylenol Extra Strength) tab 500 mg  500 mg Oral Q4H PRN    morphINE PF 2 MG/ML injection 1 mg  1 mg Intravenous Q2H PRN    Or    morphINE PF 2 MG/ML injection 2 mg  2 mg Intravenous Q2H PRN    Or    morphINE PF 4 MG/ML injection 4 mg  4 mg Intravenous Q2H PRN    ondansetron (Zofran) 4 MG/2ML injection 4 mg  4 mg Intravenous Q6H PRN    polyethylene glycol (PEG  3350) (Miralax) 17 g oral packet 17 g  17 g Oral Daily PRN    sennosides (Senokot) tab 17.2 mg  17.2 mg Oral Nightly PRN    bisacodyl (Dulcolax) 10 MG rectal suppository 10 mg  10 mg Rectal Daily PRN    amLODIPine (Norvasc) tab 10 mg  10 mg Oral Daily           Objective:   Vital Signs:  Tm 100.9  Blood pressure (!) 139/103, pulse (!) 128, temperature 98.5 °F (36.9 °C), temperature source Oral, resp. rate 20, height 5' 11\" (1.803 m), weight 202 lb 9.6 oz (91.9 kg), SpO2 94%.     I/O last 3 completed shifts:  In: 2370 [P.O.:490; I.V.:1780; IV PIGGYBACK:100]  Out: 250 [Urine:200; Emesis/NG output:25; Blood:25]    General: No acute distress. Alert and oriented x 3.  HEENT: Moist mucous membranes. EOM-I. PERRL  Neck: No lymphadenopathy.  No JVD. No carotid bruits.  Respiratory: Clear to auscultation bilaterally.  No wheezes. No rhonchi.  Cardiovascular: S1, S2.  Regular rate and rhythm.  No murmurs. Equal pulses   Abdomen: Soft, appropriate incisional tenderness, moderately distended.  Positive bowel sounds. No rebound tenderness  Incision(s): C/D/I x 4    Neurologic: No focal neurological deficits.  Musculoskeletal: Full range of motion of all extremities.  No swelling noted.  Integument: No lesions. No erythema.  Psychiatric: Appropriate mood and affect.        Assessment and Plan:     Acute appendicitis with localized peritonitis, without perforation, abscess, or gangrene  POD#1 s/p laparoscopic appendectomy and drainage intraperitoneal abscess    Low grade fever (100.9) overnight, still with significant leukocytosis  Intra-op culture cytocentrifuge specimen suggests GPC    Bowel function does not appear adequate; distended and frequent belching, no flatus    Advise  Remain on full liquids until bowel function recovers  Continue IV antibiotics, cultures pending  Consider discharge tomorrow pending progress          Results:     Lab Results   Component Value Date    WBC 24.6 (H) 03/21/2024    HGB 15.3 03/21/2024     HCT 41.9 03/21/2024    .0 03/21/2024    CREATSERUM 1.08 03/21/2024    BUN 16 03/21/2024     (L) 03/21/2024    K 4.1 03/21/2024     03/21/2024    CO2 23.0 03/21/2024     (H) 03/21/2024    CA 9.5 03/21/2024    ALB 4.5 03/21/2024    ALKPHO 58 03/21/2024    BILT 1.2 03/21/2024    TP 7.6 03/21/2024    AST 13 03/21/2024    ALT 14 03/21/2024    TSH 1.080 03/21/2023    MG 2.3 03/21/2024     03/21/2023       CT ABDOMEN+PELVIS(CONTRAST ONLY)(CPT=74177)    Result Date: 3/19/2024  CONCLUSION:   1. Acute uncomplicated appendicitis.  Recommend surgical consultation. 2. No bowel obstruction, drainable fluid collection, or pneumoperitoneum. 3. Small volume of free fluid in the pelvis, which is likely secondary to above. 4. Hepatic steatosis. 5. Mild prostatomegaly. 6. Small hiatal hernia. 7. Bilateral L5 pars defects with associated grade 1 anterolisthesis of L5 on S1 and lower lumbar predominant degenerative change. 8. Lesser incidental findings described above.     Dictated by (CST): Home Kingston MD on 3/19/2024 at 6:43 PM     Finalized by (CST): Home Kingston MD on 3/19/2024 at 6:48 PM                        Arnoldo Diaz MD  3/21/2024     109

## 2024-03-21 NOTE — PLAN OF CARE
Patient AO x4. Pain improved with PRN morphine. Able to tolerate PO oxycodone. No nausea. IVF running. Lap sites FRANCO. No drainage. With fever and tachycardiac. MD aware of spesis BPA. New lab orders entered. Ambulating with SBA. Voiding freely. Passing gas. Clear liquid diet, advance diet as tolerated. Call light within reach, fall precaution. Plan for home once medically cleared.     On-call MD aware of spesis BPA for this AM and +b/c of fluid collection. No new orders at this time.     Problem: Patient Centered Care  Goal: Patient preferences are identified and integrated in the patient's plan of care  Description: Interventions:  - What would you like us to know as we care for you? From home with family  - Provide timely, complete, and accurate information to patient/family  - Incorporate patient and family knowledge, values, beliefs, and cultural backgrounds into the planning and delivery of care  - Encourage patient/family to participate in care and decision-making at the level they choose  - Honor patient and family perspectives and choices  Outcome: Progressing     Problem: Patient/Family Goals  Goal: Patient/Family Long Term Goal  Description: Patient's Long Term Goal:     Interventions:  -   - See additional Care Plan goals for specific interventions  Outcome: Progressing  Goal: Patient/Family Short Term Goal  Description: Patient's Short Term Goal:     Interventions:   -   - See additional Care Plan goals for specific interventions  Outcome: Progressing     Problem: PAIN - ADULT  Goal: Verbalizes/displays adequate comfort level or patient's stated pain goal  Description: INTERVENTIONS:  - Encourage pt to monitor pain and request assistance  - Assess pain using appropriate pain scale  - Administer analgesics based on type and severity of pain and evaluate response  - Implement non-pharmacological measures as appropriate and evaluate response  - Consider cultural and social influences on pain and pain  management  - Manage/alleviate anxiety  - Utilize distraction and/or relaxation techniques  - Monitor for opioid side effects  - Notify MD/LIP if interventions unsuccessful or patient reports new pain  - Anticipate increased pain with activity and pre-medicate as appropriate  Outcome: Progressing     Problem: DISCHARGE PLANNING  Goal: Discharge to home or other facility with appropriate resources  Description: INTERVENTIONS:  - Identify barriers to discharge w/pt and caregiver  - Include patient/family/discharge partner in discharge planning  - Arrange for needed discharge resources and transportation as appropriate  - Identify discharge learning needs (meds, wound care, etc)  - Arrange for interpreters to assist at discharge as needed  - Consider post-discharge preferences of patient/family/discharge partner  - Complete POLST form as appropriate  - Assess patient's ability to be responsible for managing their own health  - Refer to Case Management Department for coordinating discharge planning if the patient needs post-hospital services based on physician/LIP order or complex needs related to functional status, cognitive ability or social support system  Outcome: Progressing

## 2024-03-21 NOTE — PROGRESS NOTES
[facility] Hospitalist Progress Note     CC: Hospital Follow up    PCP: No primary care provider on file.       Assessment/Plan:     Principal Problem:    Acute appendicitis with localized peritonitis, without perforation, abscess, or gangrene  Patient is a 44 year old male with PMH sig for HTN, HLD, who presents with abdominal pain.  Admitted from [facility] for acute appendicitis. S/p laparoscopic appendectomy 3/20/24.       Perforated appendicitis   Leukocytosis  Low grade fevers   - RLQ pain, with nausea   - no fevers   - CT reviewed   - Surgery consulted  - POD#1 S/p laparoscopic appendectomy 3/20/24, perforated appendicitis with purulent fluid in pelvis, extensive inflammatory reaction involving base of cecum and mesoappendix, partial cecectomy   - IVF, ceftriaxone, flagyl   - pain, nausea control   - f/u cx  - advance diet      Hyponatremia   - likely from decreased PO intake for the last 3 days  - 0.9  - trend Na      HTN - amlodipine, lisinopril   HLD  - not on statin (diet and exercise)      FN:  - IVF: 0.9  - Diet: FLD     DVT Prophy: SCDs  Atrophy: Ambulate   Lines: Piv     Dispo: pending clinical course     Outpatient records or previous hospital records reviewed.      Further recommendations pending patient's clinical course.  [facility] hospitalist to continue to follow patient while in house     Patient and/or patient's family given opportunity to ask questions and note understanding and agreeing with therapeutic plan as outlined     Thank You,  Zan Sebastian MD     [facility] Hospitalist  Answering Service number: [phone]     Subjective:     No CP, SOB, or N/V.  Has bloating sensation.  Had a BM which was watery.   Tolerating diet.     OBJECTIVE:    Blood pressure (!) 155/104, pulse 118, temperature 98.5 °F (36.9 °C), temperature source Axillary, resp. rate 20, height 5' 11\" (1.803 m), weight 202 lb 9.6 oz (91.9 kg), SpO2 94%.    Temp:  [97.1 °F (36.2 °C)-100.9 °F (38.3 °C)] 98.5 °F (36.9 °C)  Pulse:   [100-120] 118  Resp:  [13-20] 20  BP: (125-155)/() 155/104  SpO2:  [92 %-95 %] 94 %      Intake/Output:    Intake/Output Summary (Last 24 hours) at 3/21/2024 0957  Last data filed at 3/20/2024 2352  Gross per 24 hour   Intake 2140 ml   Output 250 ml   Net 1890 ml       Last 3 Weights   03/19/24 2215 202 lb 9.6 oz (91.9 kg)   03/19/24 2011 200 lb (90.7 kg)   03/21/23 1138 200 lb (90.7 kg)   03/20/23 0806 200 lb (90.7 kg)       Exam   General: Alert, no acute distress  HEENT: oral mucosa normal   Neck: non tender, no adenopathy   Lungs: clear to ausculation bilaterally  Heart: Regular rate and rhythm  Abdomen: soft, tender, non distended, incision sites c/d/i  Extremities: No edema  Skin: no new rash, normal color  Neuro: 5/5 strength in bilateral extremities, normal sensations  Psych: appropriate affect     Data Review:       Labs:     Recent Labs   Lab 03/19/24  1724 03/20/24  0348 03/21/24  0508   RBC  --  5.01 4.84   HGB  --  14.7 15.3   HCT  --  43.8 41.9   MCV 85.8 87.4 86.6   MCH  --  29.3 31.6   MCHC 35.1 33.6 36.5   RDW  --  12.3 12.4   NEPRELIM  --  21.27* 21.57*   WBC  --  24.7* 24.6*   PLT  --  215.0 184.0         Recent Labs   Lab 03/19/24  1733 03/20/24  0348 03/21/24  0508   GLU  --  118* 145*   BUN  --  12 16   CREATSERUM  --  1.15 1.08   EGFRCR 77 81 87   CA  --  9.6 9.5   NA  --  133* 133*   K  --  3.9 4.1   CL  --  102 104   CO2  --  24.0 23.0       Recent Labs   Lab 03/21/24  0508   ALT 14   AST 13   ALB 4.5         Imaging:  CT ABDOMEN+PELVIS(CONTRAST ONLY)(CPT=74177)    Result Date: 3/19/2024  CONCLUSION:   1. Acute uncomplicated appendicitis.  Recommend surgical consultation. 2. No bowel obstruction, drainable fluid collection, or pneumoperitoneum. 3. Small volume of free fluid in the pelvis, which is likely secondary to above. 4. Hepatic steatosis. 5. Mild prostatomegaly. 6. Small hiatal hernia. 7. Bilateral L5 pars defects with associated grade 1 anterolisthesis of L5 on S1 and lower  lumbar predominant degenerative change. 8. Lesser incidental findings described above.     Dictated by (CST): Home Kingston MD on 3/19/2024 at 6:43 PM     Finalized by (CST): Home Kingston MD on 3/19/2024 at 6:48 PM             Meds:      lisinopril  40 mg Oral Daily    cefTRIAXone  1 g Intravenous Q24H    metRONIDAZOLE  500 mg Intravenous Q8H    amLODIPine  10 mg Oral Daily      sodium chloride 83 mL/hr at 03/21/24 0013     metoclopramide, oxyCODONE, acetaminophen, morphINE **OR** morphINE **OR** morphINE, ondansetron, polyethylene glycol (PEG 3350), sennosides, bisacodyl

## 2024-03-21 NOTE — PLAN OF CARE
Alert and oriented x 4. Post-op day 1. Dermabond in place to 4 lap sites.  Up with standby assist. Frequent ambulation encouraged. Receiving IV fluids and antibiotics per MD order. Voiding freely. Denies need for pain medication. Tolerating full liquid diet. Fall precautions in place- bed in lowest position, call light and personal belongings within reach, non-skid socks in place. Frequent rounding by nursing staff. Plan is home pending medical clearance.    Problem: Patient Centered Care  Goal: Patient preferences are identified and integrated in the patient's plan of care  Description: Interventions:  - What would you like us to know as we care for you? From home with wife  - Provide timely, complete, and accurate information to patient/family  - Incorporate patient and family knowledge, values, beliefs, and cultural backgrounds into the planning and delivery of care  - Encourage patient/family to participate in care and decision-making at the level they choose  - Honor patient and family perspectives and choices  Outcome: Progressing     Problem: Patient/Family Goals  Goal: Patient/Family Long Term Goal  Description: Patient's Long Term Goal: go home    Interventions:  - diagnostics  -medications  -general surgery  - See additional Care Plan goals for specific interventions  Outcome: Progressing  Goal: Patient/Family Short Term Goal  Description: Patient's Short Term Goal: no more hiccups    Interventions:   - medications  - See additional Care Plan goals for specific interventions  Outcome: Progressing     Problem: PAIN - ADULT  Goal: Verbalizes/displays adequate comfort level or patient's stated pain goal  Description: INTERVENTIONS:  - Encourage pt to monitor pain and request assistance  - Assess pain using appropriate pain scale  - Administer analgesics based on type and severity of pain and evaluate response  - Implement non-pharmacological measures as appropriate and evaluate response  - Consider cultural  and social influences on pain and pain management  - Manage/alleviate anxiety  - Utilize distraction and/or relaxation techniques  - Monitor for opioid side effects  - Notify MD/LIP if interventions unsuccessful or patient reports new pain  - Anticipate increased pain with activity and pre-medicate as appropriate  Outcome: Progressing     Problem: DISCHARGE PLANNING  Goal: Discharge to home or other facility with appropriate resources  Description: INTERVENTIONS:  - Identify barriers to discharge w/pt and caregiver  - Include patient/family/discharge partner in discharge planning  - Arrange for needed discharge resources and transportation as appropriate  - Identify discharge learning needs (meds, wound care, etc)  - Arrange for interpreters to assist at discharge as needed  - Consider post-discharge preferences of patient/family/discharge partner  - Complete POLST form as appropriate  - Assess patient's ability to be responsible for managing their own health  - Refer to Case Management Department for coordinating discharge planning if the patient needs post-hospital services based on physician/LIP order or complex needs related to functional status, cognitive ability or social support system  Outcome: Progressing

## 2024-03-22 ENCOUNTER — APPOINTMENT (OUTPATIENT)
Dept: PICC SERVICES | Facility: HOSPITAL | Age: 44
End: 2024-03-22
Attending: INTERNAL MEDICINE
Payer: COMMERCIAL

## 2024-03-22 LAB
BASOPHILS # BLD AUTO: 0.1 X10(3) UL (ref 0–0.2)
BASOPHILS NFR BLD AUTO: 0.5 %
DEPRECATED RDW RBC AUTO: 38.2 FL (ref 35.1–46.3)
EOSINOPHIL # BLD AUTO: 0.01 X10(3) UL (ref 0–0.7)
EOSINOPHIL NFR BLD AUTO: 0 %
ERYTHROCYTE [DISTWIDTH] IN BLOOD BY AUTOMATED COUNT: 12.3 % (ref 11–15)
HCT VFR BLD AUTO: 40.9 %
HGB BLD-MCNC: 14.8 G/DL
IMM GRANULOCYTES # BLD AUTO: 0.3 X10(3) UL (ref 0–1)
IMM GRANULOCYTES NFR BLD: 1.4 %
LYMPHOCYTES # BLD AUTO: 1.64 X10(3) UL (ref 1–4)
LYMPHOCYTES NFR BLD AUTO: 7.4 %
MCH RBC QN AUTO: 30.8 PG (ref 26–34)
MCHC RBC AUTO-ENTMCNC: 36.2 G/DL (ref 31–37)
MCV RBC AUTO: 85.2 FL
MONOCYTES # BLD AUTO: 1.86 X10(3) UL (ref 0.1–1)
MONOCYTES NFR BLD AUTO: 8.4 %
NEUTROPHILS # BLD AUTO: 18.22 X10 (3) UL (ref 1.5–7.7)
NEUTROPHILS # BLD AUTO: 18.22 X10(3) UL (ref 1.5–7.7)
NEUTROPHILS NFR BLD AUTO: 82.3 %
PLATELET # BLD AUTO: 241 10(3)UL (ref 150–450)
RBC # BLD AUTO: 4.8 X10(6)UL
WBC # BLD AUTO: 22.1 X10(3) UL (ref 4–11)

## 2024-03-22 PROCEDURE — 85025 COMPLETE CBC W/AUTO DIFF WBC: CPT | Performed by: INTERNAL MEDICINE

## 2024-03-22 RX ORDER — METOCLOPRAMIDE 5 MG/1
10 TABLET ORAL
Status: DISCONTINUED | OUTPATIENT
Start: 2024-03-22 | End: 2024-03-25

## 2024-03-22 NOTE — PAYOR COMM NOTE
--------------  ADMISSION REVIEW     Payor: [employer]/HMO/POS/EPO  Subscriber #:  [id number]  Authorization Number: [id number]    Admit date: 3/21/24  Admit time:  2:02 PM     Admit Orders (From admission, onward)       Start     Ordered    03/21/24 1402  Admit to inpatient Once  Once        Ordering Provider: Zan Sebastian MD   Question:  Diagnosis  Answer:  Acute appendicitis with localized peritonitis, without perforation, abscess, or gangrene    03/21/24 1402    03/19/24 2220  Place in observation Once  (Place Observation Medicine)  Once        Ordering Provider: Saad Hawkins APRN   Question Answer Comment   Admitting Physician ARTIE MCCALLUM    Diagnosis Acute appendicitis with localized peritonitis, without perforation, abscess, or gangrene        03/19/24 2219                    History   43yo/m w hx of HTN reports to the ED w co RLQ pain since Sunday. Worse w time. Pain, nausea. No vomiting. No trauma. Remote hernia repair as a child. No recent abx use, travel. No urinary symptoms. Was seen at the immediate care with marked leukocytosis and a non perforated appendicitis on CT.     ED Triage Vitals [03/19/24 2011]   BP (!) 145/101   Pulse (!) 122   Resp 20   Temp 98.9 °F (37.2 °C)   Temp src Oral   SpO2 97 %   O2 Device None (Room air)     Physical Exam  HENT:      Head: Normocephalic and atraumatic.      Nose: Nose normal.      Mouth/Throat:      Mouth: Mucous membranes are moist.   Eyes:      Conjunctiva/sclera: Conjunctivae normal.      Pupils: Pupils are equal, round, and reactive to light.   Cardiovascular:      Rate and Rhythm: Normal rate and regular rhythm.      Heart sounds: Normal heart sounds.   Pulmonary:      Effort: Pulmonary effort is normal.      Breath sounds: Normal breath sounds.   Abdominal:      General: Bowel sounds are normal.      Palpations: Abdomen is soft.      Tenderness: There is abdominal tenderness.   Musculoskeletal:         General: No tenderness or deformity.  Normal range of motion.      Cervical back: Normal range of motion and neck supple.   Skin:     General: Skin is warm and dry.      Capillary Refill: Capillary refill takes less than 2 seconds.      Findings: No rash.      Comments: Normal color   Neurological:      General: No focal deficit present.      Mental Status: He is alert and oriented to person, place, and time.      GCS: GCS eye subscore is 4. GCS verbal subscore is 5. GCS motor subscore is 6.      Cranial Nerves: No cranial nerve deficit.      Gait: Gait normal.     Impression   CONCLUSION:     1. Acute uncomplicated appendicitis.  Recommend surgical consultation.  2. No bowel obstruction, drainable fluid collection, or pneumoperitoneum.  3. Small volume of free fluid in the pelvis, which is likely secondary to above.  4. Hepatic steatosis.  5. Mild prostatomegaly.  6. Small hiatal hernia.  7. Bilateral L5 pars defects with associated grade 1 anterolisthesis of L5 on S1 and lower lumbar predominant degenerative change.  8. Lesser incidental findings described above.          Admission disposition: 3/19/2024  9:11 PM     Disposition and Plan   Clinical Impression:  1. Acute appendicitis with localized peritonitis, without perforation, abscess, or gangrene       Disposition:  Admit  3/19/2024  9:11 pm       H&P   History of Present Illness: Patient is a 44 year old male with PMH sig for HTN, HLD, who presents with abdominal pain. Pain stared in the RLQ 3 days ago without radiation but worsening over the last 3 days.  Has nausea but no vomiting or diarrhea or dysuria.  Denies any chest pain, shortness of breath, no fevers or chills, no headache or dizziness.  Wife, mother and father at bedside.      CT at [facility] with perforated appendicitis   In the ED afebrile and hypertensive   Leukocytosis      BP (!) 141/94 (BP Location: Right arm)   Pulse 116   Temp 99.9 °F (37.7 °C) (Oral)   Resp 19   Ht 5' 11\" (1.803 m)   Wt 202 lb 9.6 oz (91.9 kg)   SpO2 93%    BMI 28.26 kg/m²   General: Alert, no acute distress  HEENT: oral mucosa normal   Neck: non tender, no adenopathy   Lungs: clear to ausculation bilaterally  Heart: Regular rate and rhythm  Abdomen: soft, tender, non distended   Extremities: No edema  Skin: no new rash, normal color  Neuro: 5/5 strength in bilateral extremities, normal sensations  Psych: appropriate affect   Lab 03/19/24  1724 03/20/24  0348   WBC  --  24.7*   HGB  --  14.7   MCV 85.8 87.4   PLT  --  215.0      Lab 03/20/24  0348   *   K 3.9      CO2 24.0   BUN 12   CREATSERUM 1.15   *   CA 9.6      ASSESSMENT / PLAN:   Patient is a 44 year old male with PMH sig for HTN, HLD, who presents with abdominal pain.  Admitted from [facility] for acute appendicitis.     Perforated appendicitis   Leukocytosis  - RLQ pain, with nausea   - no fevers   - CT reviewed   - Surgery consulted  - NPO, IVF, ceftriaxone, flagyl   - pain, nausea control      Hyponatremia   - likely from decreased PO intake for the last 3 days  - 0.9  - trend Na      HTN - amlodipine, hold lisinopril   HLD  - not on statin (diet and exercise)      FN:  - IVF: 0.9  - Diet: npo     DVT Prophy: SCDs      Operative Note  Date of Surgery: 03/20/24      Preoperative Diagnosis: Appendicitis [K37]  Postoperative Diagnosis: same      Procedures: Laparoscopic cecectomy, drainage peritoneal abscess  Surgical Findings: perforated appendicitis with purulent fluid in pelvis and between bowel loops, extensive inflammatory reaction involving base of cecum and mesoappendix, partial cecectomy         3/21/24  Subjective:      Has bloating sensation.  Had a BM which was watery.   Tolerating diet.      Temp:  [97.1 °F (36.2 °C)-100.9 °F (38.3 °C)] 98.5 °F (36.9 °C)  Pulse:  [100-120] 118  Resp:  [13-20] 20  BP: (125-155)/() 155/104  SpO2:  [92 %-95 %] 94 %     Lab 03/19/24  1724 03/20/24  0348 03/21/24  0508   RBC  --  5.01 4.84   HGB  --  14.7 15.3   HCT  --  43.8 41.9   MCV 85.8 87.4 86.6    MCH  --  29.3 31.6   MCHC 35.1 33.6 36.5   RDW  --  12.3 12.4   NEPRELIM  --  21.27* 21.57*   WBC  --  24.7* 24.6*   PLT  --  215.0 184.0      Lab 03/19/24  1733 03/20/24  0348 03/21/24  0508   GLU  --  118* 145*   BUN  --  12 16   CREATSERUM  --  1.15 1.08   EGFRCR 77 81 87   CA  --  9.6 9.5   NA  --  133* 133*   K  --  3.9 4.1   CL  --  102 104   CO2  --  24.0 23.0      Lab 03/21/24  0508   ALT 14   AST 13   ALB 4.5       Meds:       lisinopril  40 mg Oral Daily    cefTRIAXone  1 g Intravenous Q24H    metRONIDAZOLE  500 mg Intravenous Q8H    amLODIPine  10 mg Oral Daily       sodium chloride 83 mL/hr at 03/21/24 0013      Assessment/Plan:      Principal Problem:    Acute appendicitis with localized peritonitis, without perforation, abscess, or gangrene    Patient is a 44 year old male with PMH sig for HTN, HLD, who presents with abdominal pain.  Admitted from [facility] for acute appendicitis. S/p laparoscopic appendectomy 3/20/24.       Perforated appendicitis   Leukocytosis  Low grade fevers   - RLQ pain, with nausea   - no fevers   - CT reviewed   - Surgery consulted  - POD#1 S/p laparoscopic appendectomy 3/20/24, perforated appendicitis with purulent fluid in pelvis, extensive inflammatory reaction involving base of cecum and mesoappendix, partial cecectomy   - IVF, ceftriaxone, flagyl   - pain, nausea control   - f/u cx  - advance diet      Hyponatremia   - likely from decreased PO intake for the last 3 days  - 0.9  - trend Na      HTN - amlodipine, lisinopril   HLD  - not on statin (diet and exercise)      FN:  - IVF: 0.9  - Diet: FLD     DVT Prophy: SCDs      GENERAL SURGERY  Assessment and Plan:     Acute appendicitis with localized peritonitis, without perforation, abscess, or gangrene  POD#1 s/p laparoscopic appendectomy and drainage intraperitoneal abscess     Low grade fever (100.9) overnight, still with significant leukocytosis  Intra-op culture cytocentrifuge specimen suggests GPC     Bowel function does  not appear adequate; distended and frequent belching, no flatus     Advise  Remain on full liquids until bowel function recovers  Continue IV antibiotics, cultures pending    MEDICATIONS ADMINISTERED IN LAST 1 DAY:  amLODIPine (Norvasc) tab 10 mg       Date Action Dose Route User    3/22/2024 0837 Given 10 mg Oral Celine Keating RN          cefTRIAXone (Rocephin) 1 g in D5W 100 mL IVPB-ADD       Date Action Dose Route User    3/21/2024 2133 New Bag 1 g Intravenous Nora Rai RN          lisinopril (Prinivil; Zestril) tab 40 mg       Date Action Dose Route User    3/22/2024 0837 Given 40 mg Oral Celine Keating RN          metoclopramide (Reglan) 5 mg/mL injection 5 mg       Date Action Dose Route User    3/22/2024 0619 Given 5 mg Intravenous Nora Rai RN    3/22/2024 0057 Given 5 mg Intravenous Nora Rai RN    3/21/2024 1712 Given 5 mg Intravenous Porsha Moncada RN    3/21/2024 1211 Given 5 mg Intravenous Nirmala Zamora RN          metRONIDAZOLE in sodium chloride 0.79% (Flagyl) 5 mg/mL IVPB premix 500 mg       Date Action Dose Route User    3/22/2024 0619 New Bag 500 mg Intravenous Nora Rai RN    3/21/2024 2252 New Bag 500 mg Intravenous Nora Rai RN    3/21/2024 1325 New Bag 500 mg Intravenous Nirmala Zamora RN          sodium chloride 0.9% infusion       Date Action Dose Route User    3/22/2024 0417 New Bag (none) Intravenous Nora Rai RN    3/21/2024 1216 New Bag (none) Intravenous Nirmala Zamora RN            Vitals (last day)       Date/Time Temp Pulse Resp BP SpO2 Weight O2 Device O2 Flow Rate (L/min) Who    03/22/24 0734 99.3 °F (37.4 °C) 118 18 142/96 97 % -- None (Room air) -- SS    03/22/24 0358 98.5 °F (36.9 °C) 117 16 151/96 93 % -- None (Room air) -- DJ    03/21/24 2205 99.2 °F (37.3 °C) 118 18 138/89 94 % -- None (Room air) -- DJ    03/21/24 1608 98.8 °F (37.1 °C) 124 18 148/99 96 % -- None (Room air) --     03/21/24 0801 98.5 °F  (36.9 °C) 118 20 155/104 94 % -- None (Room air) -- LZ    03/21/24 0619 99.1 °F (37.3 °C) 119 -- -- -- -- -- -- AG    03/21/24 0549 99.8 °F (37.7 °C) 120 20 148/111 94 % -- None (Room air) -- DJ    03/21/24 0056 99.8 °F (37.7 °C) -- -- -- -- -- -- -- AG

## 2024-03-22 NOTE — PLAN OF CARE
Jose is ambulating in room and hallway with stand-by assist or independent. Tolerating full liquid diet slowly. Voiding in bathroom. 4 lap sites FRANCO and dry and intact. Discharge plan is pending medical clearance and need of IV antibiotics.  Problem: Patient Centered Care  Goal: Patient preferences are identified and integrated in the patient's plan of care  Description: Interventions:  - What would you like us to know as we care for you? From home with wife  - Provide timely, complete, and accurate information to patient/family  - Incorporate patient and family knowledge, values, beliefs, and cultural backgrounds into the planning and delivery of care  - Encourage patient/family to participate in care and decision-making at the level they choose  - Honor patient and family perspectives and choices  Outcome: Progressing     Problem: Patient/Family Goals  Goal: Patient/Family Long Term Goal  Description: Patient's Long Term Goal: go home    Interventions:  - diagnostics  -medications  -general surgery  - See additional Care Plan goals for specific interventions  Outcome: Progressing  Goal: Patient/Family Short Term Goal  Description: Patient's Short Term Goal: no more hiccups    Interventions:   - medications  - See additional Care Plan goals for specific interventions  Outcome: Progressing     Problem: PAIN - ADULT  Goal: Verbalizes/displays adequate comfort level or patient's stated pain goal  Description: INTERVENTIONS:  - Encourage pt to monitor pain and request assistance  - Assess pain using appropriate pain scale  - Administer analgesics based on type and severity of pain and evaluate response  - Implement non-pharmacological measures as appropriate and evaluate response  - Consider cultural and social influences on pain and pain management  - Manage/alleviate anxiety  - Utilize distraction and/or relaxation techniques  - Monitor for opioid side effects  - Notify MD/LIP if interventions unsuccessful or patient  reports new pain  - Anticipate increased pain with activity and pre-medicate as appropriate  Outcome: Progressing     Problem: DISCHARGE PLANNING  Goal: Discharge to home or other facility with appropriate resources  Description: INTERVENTIONS:  - Identify barriers to discharge w/pt and caregiver  - Include patient/family/discharge partner in discharge planning  - Arrange for needed discharge resources and transportation as appropriate  - Identify discharge learning needs (meds, wound care, etc)  - Arrange for interpreters to assist at discharge as needed  - Consider post-discharge preferences of patient/family/discharge partner  - Complete POLST form as appropriate  - Assess patient's ability to be responsible for managing their own health  - Refer to Case Management Department for coordinating discharge planning if the patient needs post-hospital services based on physician/LIP order or complex needs related to functional status, cognitive ability or social support system  Outcome: Progressing

## 2024-03-22 NOTE — DISCHARGE INSTRUCTIONS
Keep al follow up appointments and continue current medications.     Please follow up with Dr shine and PCP in 1 week.    Please continue soft low fiber diet until cleared for regular diet per dr. shine

## 2024-03-22 NOTE — CONSULTS
[facility]  part of [facility] Infectious Disease Consult    Gómez Sinclair Patient Status:  Inpatient    1980 MRN [id number]   Location [facility] 4W/SW/SE Attending Zan Sebastian MD   Hosp Day # 1 PCP No primary care provider on file.     Reason for Consultation:  To help with infection and treatment requested by Dr. Landrum,     History of Present Illness:  Gómez Sinclair is a 44 year old male admitted to [facility] on 3/19 with abd pain,   Significant hx of   - Htn,   Developed abd pain about three days PTA, some nausea with no vomiting,   CT scan in house with acute appendicitis,   Seen by surgery and taken to OR,   Post operatively improving slowly, WBC still elevated,   ID is now asked to help with infection and treatment,       History:  Past Medical History:   Diagnosis Date    Essential hypertension     Hyperlipidemia      Past Surgical History:   Procedure Laterality Date    REPAIR ING HERNIA,5+Y/O,REDUCIBL       No family history on file.   reports that he has never smoked. He has never used smokeless tobacco. He reports that he does not currently use alcohol. He reports that he does not use drugs.    Allergies:  No Known Allergies    Medications:    Current Facility-Administered Medications:     metoclopramide (Reglan) tab 10 mg, 10 mg, Oral, TID AC    lisinopril (Prinivil; Zestril) tab 40 mg, 40 mg, Oral, Daily    oxyCODONE immediate release tab 5 mg, 5 mg, Oral, Q4H PRN    cefTRIAXone (Rocephin) 1 g in D5W 100 mL IVPB-ADD, 1 g, Intravenous, Q24H    metRONIDAZOLE in sodium chloride 0.79% (Flagyl) 5 mg/mL IVPB premix 500 mg, 500 mg, Intravenous, Q8H    sodium chloride 0.9% infusion, , Intravenous, Continuous    acetaminophen (Tylenol Extra Strength) tab 500 mg, 500 mg, Oral, Q4H PRN    morphINE PF 2 MG/ML injection 1 mg, 1 mg, Intravenous, Q2H PRN **OR** morphINE PF 2 MG/ML injection 2 mg, 2 mg, Intravenous, Q2H PRN **OR** morphINE PF 4 MG/ML injection 4  mg, 4 mg, Intravenous, Q2H PRN    ondansetron (Zofran) 4 MG/2ML injection 4 mg, 4 mg, Intravenous, Q6H PRN    polyethylene glycol (PEG 3350) (Miralax) 17 g oral packet 17 g, 17 g, Oral, Daily PRN    sennosides (Senokot) tab 17.2 mg, 17.2 mg, Oral, Nightly PRN    bisacodyl (Dulcolax) 10 MG rectal suppository 10 mg, 10 mg, Rectal, Daily PRN    amLODIPine (Norvasc) tab 10 mg, 10 mg, Oral, Daily    Review of Systems:   Constitutional: Negative for anorexia, chills, fatigue, fevers, malaise, night sweats and weight loss.  Eyes: Negative for visual disturbance, irritation and redness.  Ears, nose, mouth, throat, and face: Negative for hearing loss, tinnitus, nasal congestion, snoring, sore throat, hoarseness and voice change.  Respiratory: Negative for cough, sputum, hemoptysis, chest pain, wheezing, dyspnea on exertion, or stridor.  Cardiovascular: Negative for chest pain, palpitations, irregular heart beats, syncope, fatigue, orthopnea, paroxysmal nocturnal dyspnea, lower extremity edema.  Gastrointestinal: Negative for dysphagia, odynophagia, reflux symptoms, nausea, vomiting, change in bowel habits, diarrhea, constipation and abdominal pain.  Integument/breast: Negative for rash, skin lesions, and pruritus.  Hematologic/lymphatic: Negative for easy bruising, bleeding, and lymphadenopathy.  Musculoskeletal: Negative for myalgias, arthralgias, muscle weakness.  Neurological: Negative for headaches, dizziness, seizures, memory problems, trouble swallowing, speech problems, gait problems and weakness.  Behavioral/Psych: Negative for active tobacco use.  Endocrine: No history of of diabetes, thyroid disorder.  All other review of systems are negative.    Vital signs in last 24 hours:  Patient Vitals for the past 24 hrs:   BP Temp Temp src Pulse Resp SpO2   03/22/24 1433 (!) 142/98 98.8 °F (37.1 °C) Oral 113 18 97 %   03/22/24 0734 (!) 142/96 99.3 °F (37.4 °C) Oral 118 18 97 %   03/22/24 0358 (!) 151/96 98.5 °F (36.9 °C)  Oral 117 16 93 %   03/21/24 2205 138/89 99.2 °F (37.3 °C) Oral 118 18 94 %   03/21/24 1608 (!) 148/99 98.8 °F (37.1 °C) Oral (!) 124 18 96 %       Physical Exam:   General: alert, cooperative, oriented.  No respiratory distress.   Head: Normocephalic, without obvious abnormality, atraumatic.   Eyes: Conjunctivae/corneas clear.  No scleral icterus.  No conjunctival     hemorrhage.   Nose: Nares normal.   Throat: Lips, mucosa, and tongue normal.  No thrush noted.   Neck: Soft, supple neck; trachea midline, no adenopathy, no thyromegaly.   Lungs: CTAB, normal and equal bilateral chest rise   Chest wall: No tenderness or deformity.   Heart: Regular rate and rhythm, normal S1S2, no murmur.   Abdomen: soft, tender, distended, positive BS.   Extremity: no edema, no cyanosis   Skin: No rashes or lesions.   Neurological: Alert, interactive, no focal deficits    Labs:  Lab Results   Component Value Date    WBC 22.1 03/22/2024    HGB 14.8 03/22/2024    HCT 40.9 03/22/2024    .0 03/22/2024       Radiology:  CT- 1. Acute uncomplicated appendicitis.  Recommend surgical consultation.   2. No bowel obstruction, drainable fluid collection, or pneumoperitoneum.   3. Small volume of free fluid in the pelvis, which is likely secondary to above.   4. Hepatic steatosis.   5. Mild prostatomegaly.   6. Small hiatal hernia.   7. Bilateral L5 pars defects with associated grade 1 anterolisthesis of L5 on S1 and lower lumbar predominant degenerative change.   8. Lesser incidental findings described above.       Cultures:  Reviewed,     Assessment and Plan:  Patient Active Problem List   Diagnosis    Acute appendicitis with localized peritonitis, without perforation, abscess, or gangrene     1.  Acute Perforated Appendicitis: admitted with abd pain of three days duration, CT in house abn,   - Taken to OR and now S/P Laparoscopic appendectomy on 3/20,   - Per OR note, Perforated appendicitis with purulent fluid in Pelvis and btw bowel  loops, extensive inflammatory reaction involving the base of cecum  and mesoappendix, partial cecectomy.  - OR GS polymicrobial, Cul with Strep Constellatus,   - On IV Ceftriaxone and Flagyl, Will advance to IV Zosyn,   - Some return of bowel function,   - Post op care per surgery team,    2.    Leukocytosis: significant, will trend,     3.     Disposition: in house, a middle aged male with perforated appendicitis, now S/P extensive abd surgery,   - Follow OR cul,   - DC IV Ceftriaxone and Flagyl,   - Start IV Zosyn, pharm to dose,   - WBC trend,   - Diet per surgery team,     Discussed with patient, RN, all questions answered, May benefit from IV abx on discharge, will follow clinically, thanks,   -       Thank you for consulting [facility] for Gómez Sinclair.  If you have any questions or concerns please call [facility] Infectious Disease at [phone].     Carlos Geronimo MD  3/22/2024  2:48 PM

## 2024-03-22 NOTE — PROGRESS NOTES
[facility]  part of [facility]    Progress Note    Gómez Sinclair Patient Status:  Observation    1980 MRN [id number]   Location [facility] 4W/SW/SE Attending Zan Sebastian MD   Hosp Day # 1 PCP No primary care provider on file.        Subjective:   Gómez Sinclair is a(n) 44 year old male     POD#2 s/p laparoscopic appendectomy and drainage of intraperitoneal abscess  With complaints of persistent hiccups   MaxT overnight 99.3F  Feels bloated and belching frequently      BM x 2 liquid since yesterday  + Flatus  + Heart burn (however, states its consistent with baseline)  - n/v    Ambulating and voiding              Allergies/Medications:   Allergies: No Known Allergies   lisinopril (Prinivil; Zestril) tab 40 mg  40 mg Oral Daily    metoclopramide (Reglan) 5 mg/mL injection 5 mg  5 mg Intravenous Q6H    oxyCODONE immediate release tab 5 mg  5 mg Oral Q4H PRN    [COMPLETED] iopamidol 76% (ISOVUE-370) injection for power injector  85 mL Intravenous ONCE PRN    [COMPLETED] cefTRIAXone (Rocephin) 1 g in D5W 100 mL IVPB-ADD  1 g Intravenous Once    [COMPLETED] metRONIDAZOLE in sodium chloride 0.79% (Flagyl) 5 mg/mL IVPB premix 500 mg  500 mg Intravenous Once    [COMPLETED] morphINE PF 4 MG/ML injection 4 mg  4 mg Intravenous Once    [] sodium chloride 0.9% infusion   Intravenous Continuous    [COMPLETED] sodium chloride 0.9% infusion   Intravenous Once    cefTRIAXone (Rocephin) 1 g in D5W 100 mL IVPB-ADD  1 g Intravenous Q24H    metRONIDAZOLE in sodium chloride 0.79% (Flagyl) 5 mg/mL IVPB premix 500 mg  500 mg Intravenous Q8H    sodium chloride 0.9% infusion   Intravenous Continuous    acetaminophen (Tylenol Extra Strength) tab 500 mg  500 mg Oral Q4H PRN    morphINE PF 2 MG/ML injection 1 mg  1 mg Intravenous Q2H PRN    Or    morphINE PF 2 MG/ML injection 2 mg  2 mg Intravenous Q2H PRN    Or    morphINE PF 4 MG/ML injection 4 mg  4 mg Intravenous Q2H PRN     ondansetron (Zofran) 4 MG/2ML injection 4 mg  4 mg Intravenous Q6H PRN    polyethylene glycol (PEG 3350) (Miralax) 17 g oral packet 17 g  17 g Oral Daily PRN    sennosides (Senokot) tab 17.2 mg  17.2 mg Oral Nightly PRN    bisacodyl (Dulcolax) 10 MG rectal suppository 10 mg  10 mg Rectal Daily PRN    amLODIPine (Norvasc) tab 10 mg  10 mg Oral Daily           Objective:   Vital Signs:  Tm 100.9  Blood pressure (!) 142/96, pulse 118, temperature 99.3 °F (37.4 °C), temperature source Oral, resp. rate 18, height 5' 11\" (1.803 m), weight 202 lb 9.6 oz (91.9 kg), SpO2 97%.     I/O last 3 completed shifts:  In: 1320 [P.O.:960; I.V.:360]  Out: -     General: No acute distress. Alert and oriented x 3.  HEENT: Moist mucous membranes. EOM-I. PERRL  Neck: No lymphadenopathy.  No JVD. No carotid bruits.  Respiratory: Clear to auscultation bilaterally.  No wheezes. No rhonchi.  Cardiovascular: S1, S2.  Regular rate and rhythm.  No murmurs. Equal pulses   Abdomen: Soft, appropriate incisional tenderness, moderately distended.  Positive bowel sounds. No rebound tenderness. Moderate amount of bloating.   Incision(s): C/D/I x 4    Neurologic: No focal neurological deficits.  Musculoskeletal: Full range of motion of all extremities.  No swelling noted.  Integument: No lesions. No erythema.  Psychiatric: Appropriate mood and affect.        Assessment and Plan:     Acute appendicitis with localized peritonitis, without perforation, abscess, or gangrene  POD#2 s/p laparoscopic appendectomy and drainage intraperitoneal abscess    Temperature continues to down trend max 99.3F overnight.  No labs drawn today, WBC 24.6k on 3.21.24    Cultures grown out Strep constellatus, will discuss with Dr. Sebastian if want to get ID involve.   Bowel function with some return with frequent hiccups concerns for persistent gastroparesis.     Bowel function does not appear adequate; distended and frequent belching, no flatus    Plan  Diet: Adv. Low  residual  Continue Abx   Consult ID?  Pulmonary hygiene  Ambulation  Add reglan PO for gastric emptying     Discussed with nurse, and Dr. Diaz, will discuss with Dr. Sebastian.          Results:     Lab Results   Component Value Date    WBC 24.6 (H) 03/21/2024    HGB 15.3 03/21/2024    HCT 41.9 03/21/2024    .0 03/21/2024    CREATSERUM 1.08 03/21/2024    BUN 16 03/21/2024     (L) 03/21/2024    K 4.1 03/21/2024     03/21/2024    CO2 23.0 03/21/2024     (H) 03/21/2024    CA 9.5 03/21/2024    ALB 4.5 03/21/2024    ALKPHO 58 03/21/2024    BILT 1.2 03/21/2024    TP 7.6 03/21/2024    AST 13 03/21/2024    ALT 14 03/21/2024    TSH 1.080 03/21/2023    MG 2.3 03/21/2024     03/21/2023       No results found.               At all points during my encounter with the patient my collaborating physician Dr. Arnoldo Diaz  was available to answer questions and update the plan as necessary. The plan as stated previously is in agreement with the team.       Ish Frank PA-C  [facility]  03/22/24  11:41 AM

## 2024-03-22 NOTE — PLAN OF CARE
Problem: PAIN - ADULT  Goal: Verbalizes/displays adequate comfort level or patient's stated pain goal  Description: INTERVENTIONS:  - Encourage pt to monitor pain and request assistance  - Assess pain using appropriate pain scale  - Administer analgesics based on type and severity of pain and evaluate response  - Implement non-pharmacological measures as appropriate and evaluate response  - Consider cultural and social influences on pain and pain management  - Manage/alleviate anxiety  - Utilize distraction and/or relaxation techniques  - Monitor for opioid side effects  - Notify MD/LIP if interventions unsuccessful or patient reports new pain  - Anticipate increased pain with activity and pre-medicate as appropriate  Outcome: Progressing     Problem: DISCHARGE PLANNING  Goal: Discharge to home or other facility with appropriate resources  Description: INTERVENTIONS:  - Identify barriers to discharge w/pt and caregiver  - Include patient/family/discharge partner in discharge planning  - Arrange for needed discharge resources and transportation as appropriate  - Identify discharge learning needs (meds, wound care, etc)  - Arrange for interpreters to assist at discharge as needed  - Consider post-discharge preferences of patient/family/discharge partner  - Complete POLST form as appropriate  - Assess patient's ability to be responsible for managing their own health  - Refer to Case Management Department for coordinating discharge planning if the patient needs post-hospital services based on physician/LIP order or complex needs related to functional status, cognitive ability or social support system  Outcome: Progressing   Pt alert and oriented up and ambulating. Iv got infiltrated and he refused to put it back. Abdomen is bloated and he got  hiccough on and off. Stated he is belching and passing gas also. Wbc remain elevated and iv antibiotic changed by ID doctor. New iv placed by iv team and is running now. Diet  advanced to soft diet. No nausea or emesis at this time. Call light with in reach and instructed to call for assistance.

## 2024-03-22 NOTE — PROGRESS NOTES
[facility] Hospitalist Progress Note     CC: Hospital Follow up    PCP: No primary care provider on file.       Assessment/Plan:     Principal Problem:    Acute appendicitis with localized peritonitis, without perforation, abscess, or gangrene  Patient is a 44 year old male with PMH sig for HTN, HLD, who presents with abdominal pain.  Admitted from [facility] for acute appendicitis. S/p laparoscopic appendectomy 3/20/24.  Cytocentrifuge specimen growing Streptococcus constellatus. ID consulted.      Perforated appendicitis   Leukocytosis  Low grade fevers   - RLQ pain, with nausea   - no fevers   - CT reviewed   - Surgery consulted  - POD#2 S/p laparoscopic appendectomy 3/20/24, perforated appendicitis with purulent fluid in pelvis, extensive inflammatory reaction involving base of cecum and mesoappendix, partial cecectomy   - IVF, ceftriaxone, flagyl   - pain, nausea control   - f/u cx with Streptococcus constellatus  - ID consulted   - advance diet   - discussed with ID and surgery      Hyponatremia   - likely from decreased PO intake for the last 3 days   - 0.9  - trend Na      HTN - amlodipine, lisinopril   HLD  - not on statin (diet and exercise)      FN:  - IVF: 0.9  - Diet: ADAT     DVT Prophy: SCDs, ambulating several times a day   Lines: Piv     Dispo: pending clinical course     Outpatient records or previous hospital records reviewed.      Further recommendations pending patient's clinical course.  [facility] hospitalist to continue to follow patient while in house     Patient and/or patient's family given opportunity to ask questions and note understanding and agreeing with therapeutic plan as outlined     Thank You,  Zan Sebastian MD     [facility] Hospitalist  Answering Service number: [phone]     Subjective:     No CP, SOB, or N/V.  Still has bloating, several watery BM  Tolerating diet.   Has hiccups.   Wife at bedside.   Patient really wants to go home today.     OBJECTIVE:    Blood pressure (!) 142/96,  pulse 118, temperature 99.3 °F (37.4 °C), temperature source Oral, resp. rate 18, height 5' 11\" (1.803 m), weight 202 lb 9.6 oz (91.9 kg), SpO2 97%.    Temp:  [98.5 °F (36.9 °C)-99.3 °F (37.4 °C)] 99.3 °F (37.4 °C)  Pulse:  [117-124] 118  Resp:  [16-18] 18  BP: (138-151)/(89-99) 142/96  SpO2:  [93 %-97 %] 97 %      Intake/Output:    Intake/Output Summary (Last 24 hours) at 3/22/2024 1409  Last data filed at 3/22/2024 0358  Gross per 24 hour   Intake 600 ml   Output --   Net 600 ml       Last 3 Weights   03/19/24 2215 202 lb 9.6 oz (91.9 kg)   03/19/24 2011 200 lb (90.7 kg)   03/21/23 1138 200 lb (90.7 kg)   03/20/23 0806 200 lb (90.7 kg)       Exam   General: Alert, no acute distress  HEENT: oral mucosa normal   Neck: non tender, no adenopathy   Lungs: clear to ausculation bilaterally  Heart: Regular rate and rhythm  Abdomen: soft, tender, some distended, incision sites c/d/i  Extremities: left arm swelling from IV line   Skin: no new rash, normal color  Neuro: 5/5 strength in bilateral extremities, normal sensations  Psych: appropriate affect     Data Review:       Labs:     Recent Labs   Lab 03/20/24  0348 03/21/24  0508 03/22/24  1219   RBC 5.01 4.84 4.80   HGB 14.7 15.3 14.8   HCT 43.8 41.9 40.9   MCV 87.4 86.6 85.2   MCH 29.3 31.6 30.8   MCHC 33.6 36.5 36.2   RDW 12.3 12.4 12.3   NEPRELIM 21.27* 21.57* 18.22*   WBC 24.7* 24.6* 22.1*   .0 184.0 241.0         Recent Labs   Lab 03/19/24  1733 03/20/24  0348 03/21/24  0508   GLU  --  118* 145*   BUN  --  12 16   CREATSERUM  --  1.15 1.08   EGFRCR 77 81 87   CA  --  9.6 9.5   NA  --  133* 133*   K  --  3.9 4.1   CL  --  102 104   CO2  --  24.0 23.0       Recent Labs   Lab 03/21/24  0508   ALT 14   AST 13   ALB 4.5         Imaging:  CT ABDOMEN+PELVIS(CONTRAST ONLY)(CPT=74177)    Result Date: 3/19/2024  CONCLUSION:   1. Acute uncomplicated appendicitis.  Recommend surgical consultation. 2. No bowel obstruction, drainable fluid collection, or pneumoperitoneum.  3. Small volume of free fluid in the pelvis, which is likely secondary to above. 4. Hepatic steatosis. 5. Mild prostatomegaly. 6. Small hiatal hernia. 7. Bilateral L5 pars defects with associated grade 1 anterolisthesis of L5 on S1 and lower lumbar predominant degenerative change. 8. Lesser incidental findings described above.     Dictated by (CST): Home Kingston MD on 3/19/2024 at 6:43 PM     Finalized by (CST): Home Kingston MD on 3/19/2024 at 6:48 PM             Meds:      metoclopramide  10 mg Oral TID AC    lisinopril  40 mg Oral Daily    cefTRIAXone  1 g Intravenous Q24H    metRONIDAZOLE  500 mg Intravenous Q8H    amLODIPine  10 mg Oral Daily      sodium chloride 83 mL/hr at 03/22/24 0417     oxyCODONE, acetaminophen, morphINE **OR** morphINE **OR** morphINE, ondansetron, polyethylene glycol (PEG 3350), sennosides, bisacodyl

## 2024-03-23 LAB
ANION GAP SERPL CALC-SCNC: 4 MMOL/L (ref 0–18)
BASOPHILS # BLD AUTO: 0.08 X10(3) UL (ref 0–0.2)
BASOPHILS NFR BLD AUTO: 0.4 %
BUN BLD-MCNC: 18 MG/DL (ref 9–23)
BUN/CREAT SERPL: 18.6 (ref 10–20)
CALCIUM BLD-MCNC: 9.2 MG/DL (ref 8.7–10.4)
CHLORIDE SERPL-SCNC: 103 MMOL/L (ref 98–112)
CO2 SERPL-SCNC: 26 MMOL/L (ref 21–32)
CREAT BLD-MCNC: 0.97 MG/DL
CRP SERPL-MCNC: 4.5 MG/DL (ref ?–1)
DEPRECATED RDW RBC AUTO: 39.3 FL (ref 35.1–46.3)
EGFRCR SERPLBLD CKD-EPI 2021: 99 ML/MIN/1.73M2 (ref 60–?)
EOSINOPHIL # BLD AUTO: 0.01 X10(3) UL (ref 0–0.7)
EOSINOPHIL NFR BLD AUTO: 0.1 %
ERYTHROCYTE [DISTWIDTH] IN BLOOD BY AUTOMATED COUNT: 12.6 % (ref 11–15)
GLUCOSE BLD-MCNC: 115 MG/DL (ref 70–99)
HCT VFR BLD AUTO: 39 %
HGB BLD-MCNC: 13.6 G/DL
IMM GRANULOCYTES # BLD AUTO: 0.3 X10(3) UL (ref 0–1)
IMM GRANULOCYTES NFR BLD: 1.6 %
LYMPHOCYTES # BLD AUTO: 1.72 X10(3) UL (ref 1–4)
LYMPHOCYTES NFR BLD AUTO: 9.2 %
MAGNESIUM SERPL-MCNC: 2.2 MG/DL (ref 1.6–2.6)
MCH RBC QN AUTO: 30 PG (ref 26–34)
MCHC RBC AUTO-ENTMCNC: 34.9 G/DL (ref 31–37)
MCV RBC AUTO: 85.9 FL
MONOCYTES # BLD AUTO: 1.76 X10(3) UL (ref 0.1–1)
MONOCYTES NFR BLD AUTO: 9.4 %
NEUTROPHILS # BLD AUTO: 14.88 X10 (3) UL (ref 1.5–7.7)
NEUTROPHILS # BLD AUTO: 14.88 X10(3) UL (ref 1.5–7.7)
NEUTROPHILS NFR BLD AUTO: 79.3 %
OSMOLALITY SERPL CALC.SUM OF ELEC: 279 MOSM/KG (ref 275–295)
PLATELET # BLD AUTO: 259 10(3)UL (ref 150–450)
POTASSIUM SERPL-SCNC: 3.3 MMOL/L (ref 3.5–5.1)
RBC # BLD AUTO: 4.54 X10(6)UL
SODIUM SERPL-SCNC: 133 MMOL/L (ref 136–145)
WBC # BLD AUTO: 18.8 X10(3) UL (ref 4–11)

## 2024-03-23 PROCEDURE — 83735 ASSAY OF MAGNESIUM: CPT | Performed by: INTERNAL MEDICINE

## 2024-03-23 PROCEDURE — 86140 C-REACTIVE PROTEIN: CPT | Performed by: INTERNAL MEDICINE

## 2024-03-23 PROCEDURE — 85025 COMPLETE CBC W/AUTO DIFF WBC: CPT | Performed by: INTERNAL MEDICINE

## 2024-03-23 PROCEDURE — 80048 BASIC METABOLIC PNL TOTAL CA: CPT | Performed by: INTERNAL MEDICINE

## 2024-03-23 RX ORDER — HYDRALAZINE HYDROCHLORIDE 25 MG/1
25 TABLET, FILM COATED ORAL EVERY 8 HOURS PRN
Status: DISCONTINUED | OUTPATIENT
Start: 2024-03-23 | End: 2024-03-25

## 2024-03-23 RX ORDER — SODIUM CHLORIDE 9 MG/ML
INJECTION, SOLUTION INTRAVENOUS CONTINUOUS
Status: DISCONTINUED | OUTPATIENT
Start: 2024-03-23 | End: 2024-03-25

## 2024-03-23 RX ORDER — BACLOFEN 10 MG/1
5 TABLET ORAL 3 TIMES DAILY PRN
Status: DISCONTINUED | OUTPATIENT
Start: 2024-03-23 | End: 2024-03-25

## 2024-03-23 RX ORDER — ACETAMINOPHEN 500 MG
1000 TABLET ORAL EVERY 6 HOURS PRN
Status: DISCONTINUED | OUTPATIENT
Start: 2024-03-23 | End: 2024-03-25

## 2024-03-23 RX ORDER — MORPHINE SULFATE 2 MG/ML
2 INJECTION, SOLUTION INTRAMUSCULAR; INTRAVENOUS EVERY 4 HOURS PRN
Status: DISCONTINUED | OUTPATIENT
Start: 2024-03-23 | End: 2024-03-25

## 2024-03-23 RX ORDER — POTASSIUM CHLORIDE 20 MEQ/1
40 TABLET, EXTENDED RELEASE ORAL ONCE
Status: COMPLETED | OUTPATIENT
Start: 2024-03-23 | End: 2024-03-23

## 2024-03-23 RX ORDER — SODIUM CHLORIDE 9 MG/ML
INJECTION, SOLUTION INTRAVENOUS CONTINUOUS
Status: DISCONTINUED | OUTPATIENT
Start: 2024-03-23 | End: 2024-03-23

## 2024-03-23 RX ORDER — HEPARIN SODIUM 5000 [USP'U]/ML
5000 INJECTION, SOLUTION INTRAVENOUS; SUBCUTANEOUS EVERY 8 HOURS
Status: DISCONTINUED | OUTPATIENT
Start: 2024-03-23 | End: 2024-03-25

## 2024-03-23 RX ORDER — TRAMADOL HYDROCHLORIDE 50 MG/1
50 TABLET ORAL EVERY 6 HOURS PRN
Status: DISCONTINUED | OUTPATIENT
Start: 2024-03-23 | End: 2024-03-25

## 2024-03-23 NOTE — PLAN OF CARE
Patient is A&Ox4. Ambulating independently. Complaints of bloating and episodes of hiccups. No complaints of pain or nausea at this time. IVF NS running. Heparin given for DVT prophylaxis. ID on consult due to strep. WBC is trending down last result was 18.8. Potassium replacement given due to level being at 3.3. Plan for home when medical clearance.     Problem: Patient Centered Care  Goal: Patient preferences are identified and integrated in the patient's plan of care  Description: Interventions:  - What would you like us to know as we care for you? From home with wife  - Provide timely, complete, and accurate information to patient/family  - Incorporate patient and family knowledge, values, beliefs, and cultural backgrounds into the planning and delivery of care  - Encourage patient/family to participate in care and decision-making at the level they choose  - Honor patient and family perspectives and choices  3/23/2024 1423 by Shaka Pollard RN lic pend  Outcome: Progressing     Problem: Patient/Family Goals  Goal: Patient/Family Long Term Goal  Description: Patient's Long Term Goal: go home  Interventions:  - diagnostics  -medications  -general surgery  - See additional Care Plan goals for specific interventions  3/23/2024 1423 by Shaka Pollard RN lic pend  Outcome: Progressing  3/23/2024 1344 by Shaka Pollard RN lic pend  Outcome: Progressing  3/23/2024 1300 by Shaka Pollard RN lic pend  Outcome: Progressing  Goal: Patient/Family Short Term Goal  Description: Patient's Short Term Goal: no more hiccups    Interventions:   - medications  - See additional Care Plan goals for specific interventions  3/23/2024 1423 by Shaka Pollard RN lic pend  Outcome: Progressing       Problem: PAIN - ADULT  Goal: Verbalizes/displays adequate comfort level or patient's stated pain goal  Description: INTERVENTIONS:  - Encourage pt to monitor pain and request assistance  - Assess pain using appropriate pain scale  - Administer  analgesics based on type and severity of pain and evaluate response  - Implement non-pharmacological measures as appropriate and evaluate response  - Consider cultural and social influences on pain and pain management  - Manage/alleviate anxiety  - Utilize distraction and/or relaxation techniques  - Monitor for opioid side effects  - Notify MD/LIP if interventions unsuccessful or patient reports new pain  - Anticipate increased pain with activity and pre-medicate as appropriate  3/23/2024 1423 by Shaka Pollard RN lic pend  Outcome: Progressing    Problem: DISCHARGE PLANNING  Goal: Discharge to home or other facility with appropriate resources  Description: INTERVENTIONS:  - Identify barriers to discharge w/pt and caregiver  - Include patient/family/discharge partner in discharge planning  - Arrange for needed discharge resources and transportation as appropriate  - Identify discharge learning needs (meds, wound care, etc)  - Arrange for interpreters to assist at discharge as needed  - Consider post-discharge preferences of patient/family/discharge partner  - Complete POLST form as appropriate  - Assess patient's ability to be responsible for managing their own health  - Refer to Case Management Department for coordinating discharge planning if the patient needs post-hospital services based on physician/LIP order or complex needs related to functional status, cognitive ability or social support system  3/23/2024 1423 by Shaka Pollard RN lic pend  Outcome: Progressing    Problem: RISK FOR INFECTION - ADULT  Goal: Absence of fever/infection during anticipated neutropenic period  Description: INTERVENTIONS  - Monitor WBC  - Administer growth factors as ordered  - Implement neutropenic guidelines  3/23/2024 1423 by Shaka Pollard RN lic pend  Outcome: Progressing    Problem: Integumentary status not within defined limits  Goal: Pt's integumentary status will be adequate for discharge  3/23/2024 1423 by Shaka Pollard RN  lic pend  Outcome: Progressing

## 2024-03-23 NOTE — PLAN OF CARE
Pt is A&O x4. Breathing on room air. Voiding freely. Up independently. Still having hiccups. Given IV antibiotic coverage. D.C. plan is return home when medically clear. Call light within reach. Safety precaution in place.    Problem: Patient Centered Care  Goal: Patient preferences are identified and integrated in the patient's plan of care  Description: Interventions:  - What would you like us to know as we care for you? From home with wife  - Provide timely, complete, and accurate information to patient/family  - Incorporate patient and family knowledge, values, beliefs, and cultural backgrounds into the planning and delivery of care  - Encourage patient/family to participate in care and decision-making at the level they choose  - Honor patient and family perspectives and choices  Outcome: Progressing     Problem: Patient/Family Goals  Goal: Patient/Family Long Term Goal  Description: Patient's Long Term Goal: go home    Interventions:  - diagnostics  -medications  -general surgery  - See additional Care Plan goals for specific interventions  Outcome: Progressing  Goal: Patient/Family Short Term Goal  Description: Patient's Short Term Goal: no more hiccups    Interventions:   - medications  - See additional Care Plan goals for specific interventions  Outcome: Progressing     Problem: PAIN - ADULT  Goal: Verbalizes/displays adequate comfort level or patient's stated pain goal  Description: INTERVENTIONS:  - Encourage pt to monitor pain and request assistance  - Assess pain using appropriate pain scale  - Administer analgesics based on type and severity of pain and evaluate response  - Implement non-pharmacological measures as appropriate and evaluate response  - Consider cultural and social influences on pain and pain management  - Manage/alleviate anxiety  - Utilize distraction and/or relaxation techniques  - Monitor for opioid side effects  - Notify MD/LIP if interventions unsuccessful or patient reports new  pain  - Anticipate increased pain with activity and pre-medicate as appropriate  Outcome: Progressing     Problem: Patient/Family Goals  Goal: Patient/Family Short Term Goal  Description: Patient's Short Term Goal: no more hiccups    Interventions:   - medications  - See additional Care Plan goals for specific interventions  Outcome: Progressing     Problem: DISCHARGE PLANNING  Goal: Discharge to home or other facility with appropriate resources  Description: INTERVENTIONS:  - Identify barriers to discharge w/pt and caregiver  - Include patient/family/discharge partner in discharge planning  - Arrange for needed discharge resources and transportation as appropriate  - Identify discharge learning needs (meds, wound care, etc)  - Arrange for interpreters to assist at discharge as needed  - Consider post-discharge preferences of patient/family/discharge partner  - Complete POLST form as appropriate  - Assess patient's ability to be responsible for managing their own health  - Refer to Case Management Department for coordinating discharge planning if the patient needs post-hospital services based on physician/LIP order or complex needs related to functional status, cognitive ability or social support system  Outcome: Progressing

## 2024-03-23 NOTE — PROGRESS NOTES
[facility]  part of [facility] Infectious Disease  Progress Note    Gómez Sinclair Patient Status:  Inpatient    1980 MRN [id number]   Location Health system 4W/SW/SE Attending Oj Domingo MD   Hosp Day # 2 PCP Austin Castro MD     Gómez Sinclair is a 44 year old male.   Chief Complaint   Patient presents with    Abdomen/Flank Pain       HPI:      Looks uncomfortable;hiccups >pain               REVIEW OF SYSTEMS:   A comprehensive 10 point review of systems was completed.  Pertinent positives and negatives noted in the the HPI.       Allergies:  No Known Allergies     Current Meds:    Current Facility-Administered Medications:     heparin (Porcine) 5000 UNIT/ML injection 5,000 Units, 5,000 Units, Subcutaneous, Q8H    sodium chloride 0.9% infusion, , Intravenous, Continuous    hydrALAZINE (Apresoline) tab 25 mg, 25 mg, Oral, Q8H PRN    acetaminophen (Tylenol Extra Strength) tab 1,000 mg, 1,000 mg, Oral, Q6H PRN    traMADol (Ultram) tab 50 mg, 50 mg, Oral, Q6H PRN    [DISCONTINUED] morphINE PF 2 MG/ML injection 1 mg, 1 mg, Intravenous, Q2H PRN **OR** morphINE PF 2 MG/ML injection 2 mg, 2 mg, Intravenous, Q4H PRN **OR** [DISCONTINUED] morphINE PF 4 MG/ML injection 4 mg, 4 mg, Intravenous, Q2H PRN    baclofen (Lioresal) tab 5 mg, 5 mg, Oral, TID PRN    metoclopramide (Reglan) tab 10 mg, 10 mg, Oral, TID AC    piperacillin-tazobactam (Zosyn) 3.375 g in dextrose 5% 100 mL IVPB-ADDV, 3.375 g, Intravenous, Q8H    lisinopril (Prinivil; Zestril) tab 40 mg, 40 mg, Oral, Daily    oxyCODONE immediate release tab 5 mg, 5 mg, Oral, Q4H PRN    ondansetron (Zofran) 4 MG/2ML injection 4 mg, 4 mg, Intravenous, Q6H PRN    polyethylene glycol (PEG 3350) (Miralax) 17 g oral packet 17 g, 17 g, Oral, Daily PRN    sennosides (Senokot) tab 17.2 mg, 17.2 mg, Oral, Nightly PRN    bisacodyl (Dulcolax) 10 MG rectal suppository 10 mg, 10 mg, Rectal, Daily PRN    amLODIPine  (Norvasc) tab 10 mg, 10 mg, Oral, Daily   No current outpatient medications on file.        HISTORY:  Past Medical History:   Diagnosis Date    Essential hypertension     Hyperlipidemia       Past Surgical History:   Procedure Laterality Date    REPAIR ING HERNIA,5+Y/O,REDUCIBL          Social history and family history negative related to present illness except as above.    PHYSICAL EXAM:   BP (!) 154/105 (BP Location: Left arm)   Pulse 108   Temp 99.1 °F (37.3 °C) (Oral)   Resp 18   Ht 5' 11\" (1.803 m)   Wt 202 lb 9.6 oz (91.9 kg)   SpO2 95%   BMI 28.26 kg/m²   GENERAL:  Awake, alert, oriented x3. Non-tox, non-septic and in NAD.  HEENT:  Normocephalic, no scleral abnormalities.  Oropharynx clear, trachea ML.  NECK:  Supple, no masses, no lymphadenopathy.  LUNGS:  Clear to auscultation b/l, no rhonchi, rales, or wheezes.  CARDIO: RRR S1/S2, no rubs, clicks, heaves, or murmurs.  GI: some distension; wound not viewed today  EXTREMITIES:  No edema, no clubbing, no cyanosis.  NEURO:  No focal neurologic deficits.  DERM:  Warm, dry, no rashes.    IMPRESSION/PLAN:   Peritonitis ruptured appendicitis   Wbc improved but still at 18k  Strep sp. On culture so far  Current rx zosyn with length of iv tbd  Spoke with pt., family  >35 min            Recent Results (from the past 72 hour(s))   Blood Culture    Collection Time: 03/20/24  8:53 PM    Specimen: Blood,peripheral   Result Value Ref Range    Blood Culture Result No Growth 2 Days    Lactic Acid, Plasma    Collection Time: 03/20/24  8:53 PM   Result Value Ref Range    Lactic Acid 1.5 0.5 - 2.0 mmol/L   Blood Culture    Collection Time: 03/20/24  8:58 PM    Specimen: Blood,peripheral   Result Value Ref Range    Blood Culture Result No Growth 2 Days    Comp Metabolic Panel (14)    Collection Time: 03/21/24  5:08 AM   Result Value Ref Range    Glucose 145 (H) 70 - 99 mg/dL    Sodium 133 (L) 136 - 145 mmol/L    Potassium 4.1 3.5 - 5.1 mmol/L    Chloride 104 98 - 112  mmol/L    CO2 23.0 21.0 - 32.0 mmol/L    Anion Gap 6 0 - 18 mmol/L    BUN 16 9 - 23 mg/dL    Creatinine 1.08 0.70 - 1.30 mg/dL    BUN/CREA Ratio 14.8 10.0 - 20.0    Calcium, Total 9.5 8.7 - 10.4 mg/dL    Calculated Osmolality 280 275 - 295 mOsm/kg    eGFR-Cr 87 >=60 mL/min/1.73m2    ALT 14 10 - 49 U/L    AST 13 <=34 U/L    Alkaline Phosphatase 58 45 - 117 U/L    Bilirubin, Total 1.2 0.3 - 1.2 mg/dL    Total Protein 7.6 5.7 - 8.2 g/dL    Albumin 4.5 3.2 - 4.8 g/dL    Globulin  3.1 2.8 - 4.4 g/dL    A/G Ratio 1.5 1.0 - 2.0   Magnesium    Collection Time: 03/21/24  5:08 AM   Result Value Ref Range    Magnesium 2.3 1.6 - 2.6 mg/dL   CBC W/ DIFFERENTIAL    Collection Time: 03/21/24  5:08 AM   Result Value Ref Range    WBC 24.6 (H) 4.0 - 11.0 x10(3) uL    RBC 4.84 4.30 - 5.70 x10(6)uL    HGB 15.3 13.0 - 17.5 g/dL    HCT 41.9 39.0 - 53.0 %    MCV 86.6 80.0 - 100.0 fL    MCH 31.6 26.0 - 34.0 pg    MCHC 36.5 31.0 - 37.0 g/dL    RDW-SD 39.1 35.1 - 46.3 fL    RDW 12.4 11.0 - 15.0 %    .0 150.0 - 450.0 10(3)uL    Neutrophil Absolute Prelim 21.57 (H) 1.50 - 7.70 x10 (3) uL    Neutrophil Absolute 21.57 (H) 1.50 - 7.70 x10(3) uL    Lymphocyte Absolute 1.16 1.00 - 4.00 x10(3) uL    Monocyte Absolute 1.57 (H) 0.10 - 1.00 x10(3) uL    Eosinophil Absolute 0.00 0.00 - 0.70 x10(3) uL    Basophil Absolute 0.04 0.00 - 0.20 x10(3) uL    Immature Granulocyte Absolute 0.30 0.00 - 1.00 x10(3) uL    Neutrophil % 87.5 %    Lymphocyte % 4.7 %    Monocyte % 6.4 %    Eosinophil % 0.0 %    Basophil % 0.2 %    Immature Granulocyte % 1.2 %   Clostridium difficile(toxigenic)PCR    Collection Time: 03/21/24  3:58 PM    Specimen: Stool   Result Value Ref Range    C. Difficile Toxin B Gene Negative Negative   CBC W/ DIFFERENTIAL    Collection Time: 03/22/24 12:19 PM   Result Value Ref Range    WBC 22.1 (H) 4.0 - 11.0 x10(3) uL    RBC 4.80 4.30 - 5.70 x10(6)uL    HGB 14.8 13.0 - 17.5 g/dL    HCT 40.9 39.0 - 53.0 %    MCV 85.2 80.0 - 100.0 fL    MCH  30.8 26.0 - 34.0 pg    MCHC 36.2 31.0 - 37.0 g/dL    RDW-SD 38.2 35.1 - 46.3 fL    RDW 12.3 11.0 - 15.0 %    .0 150.0 - 450.0 10(3)uL    Neutrophil Absolute Prelim 18.22 (H) 1.50 - 7.70 x10 (3) uL    Neutrophil Absolute 18.22 (H) 1.50 - 7.70 x10(3) uL    Lymphocyte Absolute 1.64 1.00 - 4.00 x10(3) uL    Monocyte Absolute 1.86 (H) 0.10 - 1.00 x10(3) uL    Eosinophil Absolute 0.01 0.00 - 0.70 x10(3) uL    Basophil Absolute 0.10 0.00 - 0.20 x10(3) uL    Immature Granulocyte Absolute 0.30 0.00 - 1.00 x10(3) uL    Neutrophil % 82.3 %    Lymphocyte % 7.4 %    Monocyte % 8.4 %    Eosinophil % 0.0 %    Basophil % 0.5 %    Immature Granulocyte % 1.4 %   RAINBOW DRAW LIGHT GREEN    Collection Time: 03/22/24 12:19 PM   Result Value Ref Range    Hold Lt Green Auto Resulted    Basic Metabolic Panel (8)    Collection Time: 03/23/24  5:10 AM   Result Value Ref Range    Glucose 115 (H) 70 - 99 mg/dL    Sodium 133 (L) 136 - 145 mmol/L    Potassium 3.3 (L) 3.5 - 5.1 mmol/L    Chloride 103 98 - 112 mmol/L    CO2 26.0 21.0 - 32.0 mmol/L    Anion Gap 4 0 - 18 mmol/L    BUN 18 9 - 23 mg/dL    Creatinine 0.97 0.70 - 1.30 mg/dL    BUN/CREA Ratio 18.6 10.0 - 20.0    Calcium, Total 9.2 8.7 - 10.4 mg/dL    Calculated Osmolality 279 275 - 295 mOsm/kg    eGFR-Cr 99 >=60 mL/min/1.73m2   Magnesium    Collection Time: 03/23/24  5:10 AM   Result Value Ref Range    Magnesium 2.2 1.6 - 2.6 mg/dL   C-Reactive Protein    Collection Time: 03/23/24  5:10 AM   Result Value Ref Range    C-Reactive Protein 4.50 (H) <1.00 mg/dL   CBC W/ DIFFERENTIAL    Collection Time: 03/23/24  5:10 AM   Result Value Ref Range    WBC 18.8 (H) 4.0 - 11.0 x10(3) uL    RBC 4.54 4.30 - 5.70 x10(6)uL    HGB 13.6 13.0 - 17.5 g/dL    HCT 39.0 39.0 - 53.0 %    MCV 85.9 80.0 - 100.0 fL    MCH 30.0 26.0 - 34.0 pg    MCHC 34.9 31.0 - 37.0 g/dL    RDW-SD 39.3 35.1 - 46.3 fL    RDW 12.6 11.0 - 15.0 %    .0 150.0 - 450.0 10(3)uL    Neutrophil Absolute Prelim 14.88 (H) 1.50  - 7.70 x10 (3) uL    Neutrophil Absolute 14.88 (H) 1.50 - 7.70 x10(3) uL    Lymphocyte Absolute 1.72 1.00 - 4.00 x10(3) uL    Monocyte Absolute 1.76 (H) 0.10 - 1.00 x10(3) uL    Eosinophil Absolute 0.01 0.00 - 0.70 x10(3) uL    Basophil Absolute 0.08 0.00 - 0.20 x10(3) uL    Immature Granulocyte Absolute 0.30 0.00 - 1.00 x10(3) uL    Neutrophil % 79.3 %    Lymphocyte % 9.2 %    Monocyte % 9.4 %    Eosinophil % 0.1 %    Basophil % 0.4 %    Immature Granulocyte % 1.6 %         Lul Justice MD     CALL [facility] INFECTIOUS DISEASE AT [phone] IF QUESTIONS OR CONCERNS  THANKS

## 2024-03-23 NOTE — PLAN OF CARE
GASTROINTESTINAL - ADULT    • Minimal or absence of nausea and vomiting Progressing    • Maintains or returns to baseline bowel function Progressing        POSTPARTUM    • Long Term Goal:Experiences normal postpartum course Progressing    • Optimize infant Sepsis alert fired this afternoon and notified doctor no new orders, White count is coming down and ID changing his antibiotic so just watch him closely no new orders per Dr Sebastian

## 2024-03-23 NOTE — PROGRESS NOTES
[facility] Hospitalist Progress Note     CC: Hospital Follow up    PCP: Austin Castro MD       Assessment/Plan:     Principal Problem:    Acute appendicitis with localized peritonitis, without perforation, abscess, or gangrene  Mr. Sinclair is a 44 year old male with PMH sig for HTN, HLD, who presents with abdominal pain.  Admitted from [facility] for acute appendicitis. S/p laparoscopic appendectomy 3/20/24.  culture growing Streptococcus constellatus. ID consulted.      Perforated appendicitis   Leukocytosis  Low grade fevers   - RLQ pain, with nausea   - Surgery consulted  - POD#3 S/p laparoscopic appendectomy 3/20/24, perforated appendicitis with purulent fluid in pelvis, extensive inflammatory reaction involving base of cecum and mesoappendix, partial cecectomy   - IV zosyn  - pain, nausea control   - f/u cx with Streptococcus constellatus  - ID consulted   - advance diet   - IVF  - abd distended this am, rec ambulation, Surg to eval, is passing some flatus     Hyponatremia   - likely from decreased PO intake for the last 3 days   - 0.9  - trend Na      HTN   - amlodipine, lisinopril   - BP elevated but also with pain and IVF, will hold on titration of meds given op BP appropriate     HLD    - not on statin (diet and exercise)      FN:  - IVF: 0.9  - Diet: ADAT     DVT Prophy: SCDs, heparin, ambulate  Lines: Piv     Dispo: pending clinical course     Outpatient records or previous hospital records reviewed.      Further recommendations pending patient's clinical course.  [facility] hospitalist to continue to follow patient while in house     Patient and/or patient's family given opportunity to ask questions and note understanding and agreeing with therapeutic plan as outlined     Thank You,  Oj Domingo MD     [facility] Hospitalist  Answering Service number: [phone]     Subjective:     No CP, SOB, or N/V.  Still has bloating, abdomen feels distended  Has hiccups.       OBJECTIVE:    Blood pressure (!) 161/103, pulse  101, temperature 98.8 °F (37.1 °C), temperature source Oral, resp. rate 18, height 5' 11\" (1.803 m), weight 202 lb 9.6 oz (91.9 kg), SpO2 96%.    Temp:  [97.9 °F (36.6 °C)-98.8 °F (37.1 °C)] 98.8 °F (37.1 °C)  Pulse:  [101-113] 101  Resp:  [18] 18  BP: (133-161)/() 161/103  SpO2:  [93 %-97 %] 96 %      Intake/Output:  No intake or output data in the 24 hours ending 03/23/24 1126      Last 3 Weights   03/19/24 2215 202 lb 9.6 oz (91.9 kg)   03/19/24 2011 200 lb (90.7 kg)   03/21/23 1138 200 lb (90.7 kg)   03/20/23 0806 200 lb (90.7 kg)       Exam   General: Alert, no acute distress  HEENT: oral mucosa normal   Neck: non tender, no adenopathy   Lungs: clear to ausculation bilaterally  Heart: Regular rate and rhythm  Abdomen: soft, tender, distended, incision sites c/d/i, bowel sounds diminished  Extremities: left arm swelling from IV line   Skin: no new rash, normal color  Neuro: 5/5 strength in bilateral extremities, normal sensations  Psych: appropriate affect     Data Review:       Labs:     Recent Labs   Lab 03/21/24  0508 03/22/24  1219 03/23/24  0510   RBC 4.84 4.80 4.54   HGB 15.3 14.8 13.6   HCT 41.9 40.9 39.0   MCV 86.6 85.2 85.9   MCH 31.6 30.8 30.0   MCHC 36.5 36.2 34.9   RDW 12.4 12.3 12.6   NEPRELIM 21.57* 18.22* 14.88*   WBC 24.6* 22.1* 18.8*   .0 241.0 259.0         Recent Labs   Lab 03/20/24  0348 03/21/24  0508 03/23/24  0510   * 145* 115*   BUN 12 16 18   CREATSERUM 1.15 1.08 0.97   EGFRCR 81 87 99   CA 9.6 9.5 9.2   * 133* 133*   K 3.9 4.1 3.3*    104 103   CO2 24.0 23.0 26.0       Recent Labs   Lab 03/21/24  0508   ALT 14   AST 13   ALB 4.5         Imaging:  No results found.      Meds:      heparin  5,000 Units Subcutaneous Q8H    metoclopramide  10 mg Oral TID AC    piperacillin-tazobactam  3.375 g Intravenous Q8H    lisinopril  40 mg Oral Daily    amLODIPine  10 mg Oral Daily      sodium chloride 75 mL/hr at 03/23/24 0938     oxyCODONE, acetaminophen, morphINE  **OR** morphINE **OR** morphINE, ondansetron, polyethylene glycol (PEG 3350), sennosides, bisacodyl

## 2024-03-24 LAB
ANION GAP SERPL CALC-SCNC: 8 MMOL/L (ref 0–18)
BASOPHILS # BLD AUTO: 0.12 X10(3) UL (ref 0–0.2)
BASOPHILS NFR BLD AUTO: 0.7 %
BUN BLD-MCNC: 15 MG/DL (ref 9–23)
BUN/CREAT SERPL: 18.3 (ref 10–20)
CALCIUM BLD-MCNC: 9 MG/DL (ref 8.7–10.4)
CHLORIDE SERPL-SCNC: 104 MMOL/L (ref 98–112)
CO2 SERPL-SCNC: 23 MMOL/L (ref 21–32)
CREAT BLD-MCNC: 0.82 MG/DL
DEPRECATED RDW RBC AUTO: 38.3 FL (ref 35.1–46.3)
EGFRCR SERPLBLD CKD-EPI 2021: 112 ML/MIN/1.73M2 (ref 60–?)
EOSINOPHIL # BLD AUTO: 0.07 X10(3) UL (ref 0–0.7)
EOSINOPHIL NFR BLD AUTO: 0.4 %
ERYTHROCYTE [DISTWIDTH] IN BLOOD BY AUTOMATED COUNT: 12.5 % (ref 11–15)
GLUCOSE BLD-MCNC: 116 MG/DL (ref 70–99)
HCT VFR BLD AUTO: 34.7 %
HGB BLD-MCNC: 12.4 G/DL
IMM GRANULOCYTES # BLD AUTO: 0.46 X10(3) UL (ref 0–1)
IMM GRANULOCYTES NFR BLD: 2.8 %
LYMPHOCYTES # BLD AUTO: 2.33 X10(3) UL (ref 1–4)
LYMPHOCYTES NFR BLD AUTO: 14.4 %
MAGNESIUM SERPL-MCNC: 2.2 MG/DL (ref 1.6–2.6)
MCH RBC QN AUTO: 30.3 PG (ref 26–34)
MCHC RBC AUTO-ENTMCNC: 35.7 G/DL (ref 31–37)
MCV RBC AUTO: 84.8 FL
MONOCYTES # BLD AUTO: 1.71 X10(3) UL (ref 0.1–1)
MONOCYTES NFR BLD AUTO: 10.6 %
NEUTROPHILS # BLD AUTO: 11.51 X10 (3) UL (ref 1.5–7.7)
NEUTROPHILS # BLD AUTO: 11.51 X10(3) UL (ref 1.5–7.7)
NEUTROPHILS NFR BLD AUTO: 71.1 %
OSMOLALITY SERPL CALC.SUM OF ELEC: 282 MOSM/KG (ref 275–295)
PLATELET # BLD AUTO: 269 10(3)UL (ref 150–450)
POTASSIUM SERPL-SCNC: 3.5 MMOL/L (ref 3.5–5.1)
POTASSIUM SERPL-SCNC: 3.5 MMOL/L (ref 3.5–5.1)
RBC # BLD AUTO: 4.09 X10(6)UL
SODIUM SERPL-SCNC: 135 MMOL/L (ref 136–145)
WBC # BLD AUTO: 16.2 X10(3) UL (ref 4–11)

## 2024-03-24 PROCEDURE — 84132 ASSAY OF SERUM POTASSIUM: CPT | Performed by: HOSPITALIST

## 2024-03-24 PROCEDURE — 85025 COMPLETE CBC W/AUTO DIFF WBC: CPT | Performed by: HOSPITALIST

## 2024-03-24 PROCEDURE — 80048 BASIC METABOLIC PNL TOTAL CA: CPT | Performed by: HOSPITALIST

## 2024-03-24 PROCEDURE — 83735 ASSAY OF MAGNESIUM: CPT | Performed by: HOSPITALIST

## 2024-03-24 NOTE — PROGRESS NOTES
[facility]  part of [facility] Infectious Disease  Progress Note    Gómez Sinclair Patient Status:  Inpatient    1980 MRN [id number]   Location [facility] 4W/SW/SE Attending Oj Domingo MD   Hosp Day # 3 PCP Austin Castro MD     Gómez Sinclair is a 44 year old male.   Chief Complaint   Patient presents with    Abdomen/Flank Pain       HPI:      Less ill appearing               REVIEW OF SYSTEMS:   A comprehensive 10 point review of systems was completed.  Pertinent positives and negatives noted in the the HPI.       Allergies:  No Known Allergies     Current Meds:    Current Facility-Administered Medications:     heparin (Porcine) 5000 UNIT/ML injection 5,000 Units, 5,000 Units, Subcutaneous, Q8H    sodium chloride 0.9% infusion, , Intravenous, Continuous    hydrALAZINE (Apresoline) tab 25 mg, 25 mg, Oral, Q8H PRN    acetaminophen (Tylenol Extra Strength) tab 1,000 mg, 1,000 mg, Oral, Q6H PRN    traMADol (Ultram) tab 50 mg, 50 mg, Oral, Q6H PRN    [DISCONTINUED] morphINE PF 2 MG/ML injection 1 mg, 1 mg, Intravenous, Q2H PRN **OR** morphINE PF 2 MG/ML injection 2 mg, 2 mg, Intravenous, Q4H PRN **OR** [DISCONTINUED] morphINE PF 4 MG/ML injection 4 mg, 4 mg, Intravenous, Q2H PRN    baclofen (Lioresal) tab 5 mg, 5 mg, Oral, TID PRN    metoclopramide (Reglan) tab 10 mg, 10 mg, Oral, TID AC    piperacillin-tazobactam (Zosyn) 3.375 g in dextrose 5% 100 mL IVPB-ADDV, 3.375 g, Intravenous, Q8H    lisinopril (Prinivil; Zestril) tab 40 mg, 40 mg, Oral, Daily    oxyCODONE immediate release tab 5 mg, 5 mg, Oral, Q4H PRN    ondansetron (Zofran) 4 MG/2ML injection 4 mg, 4 mg, Intravenous, Q6H PRN    polyethylene glycol (PEG 3350) (Miralax) 17 g oral packet 17 g, 17 g, Oral, Daily PRN    sennosides (Senokot) tab 17.2 mg, 17.2 mg, Oral, Nightly PRN    bisacodyl (Dulcolax) 10 MG rectal suppository 10 mg, 10 mg, Rectal, Daily PRN    amLODIPine (Norvasc) tab 10 mg,  10 mg, Oral, Daily   No current outpatient medications on file.        HISTORY:  Past Medical History:   Diagnosis Date    Essential hypertension     Hyperlipidemia       Past Surgical History:   Procedure Laterality Date    REPAIR ING HERNIA,5+Y/O,REDUCIBL          Social history and family history negative related to present illness except as above.    PHYSICAL EXAM:   BP (!) 143/92 (BP Location: Left arm)   Pulse 97   Temp 97.7 °F (36.5 °C) (Temporal)   Resp 16   Ht 5' 11\" (1.803 m)   Wt 202 lb 9.6 oz (91.9 kg)   SpO2 98%   BMI 28.26 kg/m²   GENERAL:  Awake, alert, oriented x3. Non-tox, non-septic and in NAD.  HEENT:  Normocephalic, no scleral abnormalities.  Oropharynx clear, trachea ML.  NECK:  Supple, no masses, no lymphadenopathy.  LUNGS:  Clear to auscultation b/l, no rhonchi, rales, or wheezes.  CARDIO: RRR S1/S2, no rubs, clicks, heaves, or murmurs.  GI:  Some distension nontender  EXTREMITIES:  No edema, no clubbing, no cyanosis.  NEURO:  No focal neurologic deficits.  DERM:  Warm, dry, no rashes.    IMPRESSION/PLAN:        Peritonitis ruptured appendicitis   Wbc improved at 16k  Strep sp. On culture so far  Current rx zosyn with length of iv tbd  Spoke with pt., family; advancing diet and hiccups are better  >35 min   overall improving nicely            Recent Results (from the past 72 hour(s))   Clostridium difficile(toxigenic)PCR    Collection Time: 03/21/24  3:58 PM    Specimen: Stool   Result Value Ref Range    C. Difficile Toxin B Gene Negative Negative   CBC W/ DIFFERENTIAL    Collection Time: 03/22/24 12:19 PM   Result Value Ref Range    WBC 22.1 (H) 4.0 - 11.0 x10(3) uL    RBC 4.80 4.30 - 5.70 x10(6)uL    HGB 14.8 13.0 - 17.5 g/dL    HCT 40.9 39.0 - 53.0 %    MCV 85.2 80.0 - 100.0 fL    MCH 30.8 26.0 - 34.0 pg    MCHC 36.2 31.0 - 37.0 g/dL    RDW-SD 38.2 35.1 - 46.3 fL    RDW 12.3 11.0 - 15.0 %    .0 150.0 - 450.0 10(3)uL    Neutrophil Absolute Prelim 18.22 (H) 1.50 - 7.70 x10 (3)  uL    Neutrophil Absolute 18.22 (H) 1.50 - 7.70 x10(3) uL    Lymphocyte Absolute 1.64 1.00 - 4.00 x10(3) uL    Monocyte Absolute 1.86 (H) 0.10 - 1.00 x10(3) uL    Eosinophil Absolute 0.01 0.00 - 0.70 x10(3) uL    Basophil Absolute 0.10 0.00 - 0.20 x10(3) uL    Immature Granulocyte Absolute 0.30 0.00 - 1.00 x10(3) uL    Neutrophil % 82.3 %    Lymphocyte % 7.4 %    Monocyte % 8.4 %    Eosinophil % 0.0 %    Basophil % 0.5 %    Immature Granulocyte % 1.4 %   RAINBOW DRAW LIGHT GREEN    Collection Time: 03/22/24 12:19 PM   Result Value Ref Range    Hold Lt Green Auto Resulted    Basic Metabolic Panel (8)    Collection Time: 03/23/24  5:10 AM   Result Value Ref Range    Glucose 115 (H) 70 - 99 mg/dL    Sodium 133 (L) 136 - 145 mmol/L    Potassium 3.3 (L) 3.5 - 5.1 mmol/L    Chloride 103 98 - 112 mmol/L    CO2 26.0 21.0 - 32.0 mmol/L    Anion Gap 4 0 - 18 mmol/L    BUN 18 9 - 23 mg/dL    Creatinine 0.97 0.70 - 1.30 mg/dL    BUN/CREA Ratio 18.6 10.0 - 20.0    Calcium, Total 9.2 8.7 - 10.4 mg/dL    Calculated Osmolality 279 275 - 295 mOsm/kg    eGFR-Cr 99 >=60 mL/min/1.73m2   Magnesium    Collection Time: 03/23/24  5:10 AM   Result Value Ref Range    Magnesium 2.2 1.6 - 2.6 mg/dL   C-Reactive Protein    Collection Time: 03/23/24  5:10 AM   Result Value Ref Range    C-Reactive Protein 4.50 (H) <1.00 mg/dL   CBC W/ DIFFERENTIAL    Collection Time: 03/23/24  5:10 AM   Result Value Ref Range    WBC 18.8 (H) 4.0 - 11.0 x10(3) uL    RBC 4.54 4.30 - 5.70 x10(6)uL    HGB 13.6 13.0 - 17.5 g/dL    HCT 39.0 39.0 - 53.0 %    MCV 85.9 80.0 - 100.0 fL    MCH 30.0 26.0 - 34.0 pg    MCHC 34.9 31.0 - 37.0 g/dL    RDW-SD 39.3 35.1 - 46.3 fL    RDW 12.6 11.0 - 15.0 %    .0 150.0 - 450.0 10(3)uL    Neutrophil Absolute Prelim 14.88 (H) 1.50 - 7.70 x10 (3) uL    Neutrophil Absolute 14.88 (H) 1.50 - 7.70 x10(3) uL    Lymphocyte Absolute 1.72 1.00 - 4.00 x10(3) uL    Monocyte Absolute 1.76 (H) 0.10 - 1.00 x10(3) uL    Eosinophil Absolute  0.01 0.00 - 0.70 x10(3) uL    Basophil Absolute 0.08 0.00 - 0.20 x10(3) uL    Immature Granulocyte Absolute 0.30 0.00 - 1.00 x10(3) uL    Neutrophil % 79.3 %    Lymphocyte % 9.2 %    Monocyte % 9.4 %    Eosinophil % 0.1 %    Basophil % 0.4 %    Immature Granulocyte % 1.6 %   Potassium    Collection Time: 03/24/24  5:57 AM   Result Value Ref Range    Potassium 3.5 3.5 - 5.1 mmol/L   Basic Metabolic Panel (8)    Collection Time: 03/24/24  5:57 AM   Result Value Ref Range    Glucose 116 (H) 70 - 99 mg/dL    Sodium 135 (L) 136 - 145 mmol/L    Potassium 3.5 3.5 - 5.1 mmol/L    Chloride 104 98 - 112 mmol/L    CO2 23.0 21.0 - 32.0 mmol/L    Anion Gap 8 0 - 18 mmol/L    BUN 15 9 - 23 mg/dL    Creatinine 0.82 0.70 - 1.30 mg/dL    BUN/CREA Ratio 18.3 10.0 - 20.0    Calcium, Total 9.0 8.7 - 10.4 mg/dL    Calculated Osmolality 282 275 - 295 mOsm/kg    eGFR-Cr 112 >=60 mL/min/1.73m2   Magnesium    Collection Time: 03/24/24  5:57 AM   Result Value Ref Range    Magnesium 2.2 1.6 - 2.6 mg/dL   CBC W/ DIFFERENTIAL    Collection Time: 03/24/24  5:57 AM   Result Value Ref Range    WBC 16.2 (H) 4.0 - 11.0 x10(3) uL    RBC 4.09 (L) 4.30 - 5.70 x10(6)uL    HGB 12.4 (L) 13.0 - 17.5 g/dL    HCT 34.7 (L) 39.0 - 53.0 %    MCV 84.8 80.0 - 100.0 fL    MCH 30.3 26.0 - 34.0 pg    MCHC 35.7 31.0 - 37.0 g/dL    RDW-SD 38.3 35.1 - 46.3 fL    RDW 12.5 11.0 - 15.0 %    .0 150.0 - 450.0 10(3)uL    Neutrophil Absolute Prelim 11.51 (H) 1.50 - 7.70 x10 (3) uL    Neutrophil Absolute 11.51 (H) 1.50 - 7.70 x10(3) uL    Lymphocyte Absolute 2.33 1.00 - 4.00 x10(3) uL    Monocyte Absolute 1.71 (H) 0.10 - 1.00 x10(3) uL    Eosinophil Absolute 0.07 0.00 - 0.70 x10(3) uL    Basophil Absolute 0.12 0.00 - 0.20 x10(3) uL    Immature Granulocyte Absolute 0.46 0.00 - 1.00 x10(3) uL    Neutrophil % 71.1 %    Lymphocyte % 14.4 %    Monocyte % 10.6 %    Eosinophil % 0.4 %    Basophil % 0.7 %    Immature Granulocyte % 2.8 %   Scan slide    Collection Time: 03/24/24   5:57 AM   Result Value Ref Range    Slide Review       Slide reviewed, normal WBC, RBC, and platelet morphology.         Lul Justice MD     CALL [facility] INFECTIOUS DISEASE AT [phone] IF QUESTIONS OR CONCERNS  THANKS

## 2024-03-24 NOTE — PLAN OF CARE
Post-op day #4. Dermabond to incisions. Monitoring vital signs- stable at this time. No acute changes noted throughout shift. Receiving IV fluids per MD order. Tolerating clear diet. Voiding. Heparin for DVT prophylaxis. Denies pain. Ambulates independently in cline. Encouraged frequent ambulation and use of incentive spirometer. Fall precautions maintained- bed alarm on, bed locked in lowest position, call light and personal belongings within reach, non-skid socks in place to bilateral feet. Frequent rounding by nursing staff. Plan to be discharged home, pending MD clearance. ID & surgery on consult.     Problem: Patient Centered Care  Goal: Patient preferences are identified and integrated in the patient's plan of care  Description: Interventions:  - What would you like us to know as we care for you? From home with wife  - Provide timely, complete, and accurate information to patient/family  - Incorporate patient and family knowledge, values, beliefs, and cultural backgrounds into the planning and delivery of care  - Encourage patient/family to participate in care and decision-making at the level they choose  - Honor patient and family perspectives and choices  Outcome: Progressing     Problem: Patient/Family Goals  Goal: Patient/Family Long Term Goal  Description: Patient's Long Term Goal: to go home    Interventions:  -diagnostics per order  - update pt. And family  - follow MD orders   -medications  -general surgery  - See additional Care Plan goals for specific interventions  Outcome: Progressing  Goal: Patient/Family Short Term Goal  Description: Patient's Short Term Goal: no more hiccups    Interventions:   - medications  - relieve GI discomfort   - Ambulate   - See additional Care Plan goals for specific interventions  Outcome: Progressing     Problem: PAIN - ADULT  Goal: Verbalizes/displays adequate comfort level or patient's stated pain goal  Description: INTERVENTIONS:  - Encourage pt to monitor pain and  request assistance  - Assess pain using appropriate pain scale  - Administer analgesics based on type and severity of pain and evaluate response  - Implement non-pharmacological measures as appropriate and evaluate response  - Consider cultural and social influences on pain and pain management  - Manage/alleviate anxiety  - Utilize distraction and/or relaxation techniques  - Monitor for opioid side effects  - Notify MD/LIP if interventions unsuccessful or patient reports new pain  - Anticipate increased pain with activity and pre-medicate as appropriate  Outcome: Progressing     Problem: DISCHARGE PLANNING  Goal: Discharge to home or other facility with appropriate resources  Description: INTERVENTIONS:  - Identify barriers to discharge w/pt and caregiver  - Include patient/family/discharge partner in discharge planning  - Arrange for needed discharge resources and transportation as appropriate  - Identify discharge learning needs (meds, wound care, etc)  - Arrange for interpreters to assist at discharge as needed  - Consider post-discharge preferences of patient/family/discharge partner  - Complete POLST form as appropriate  - Assess patient's ability to be responsible for managing their own health  - Refer to Case Management Department for coordinating discharge planning if the patient needs post-hospital services based on physician/LIP order or complex needs related to functional status, cognitive ability or social support system  Outcome: Progressing     Problem: RISK FOR INFECTION - ADULT  Goal: Absence of fever/infection during anticipated neutropenic period  Description: INTERVENTIONS  - Monitor WBC  - Administer growth factors as ordered  - Implement neutropenic guidelines  Outcome: Progressing     Problem: Integumentary status not within defined limits  Goal: Pt's integumentary status will be adequate for discharge  Outcome: Progressing

## 2024-03-24 NOTE — PROGRESS NOTES
[facility] Hospitalist Progress Note     CC: Hospital Follow up    PCP: Austin Castro MD       Assessment/Plan:     Principal Problem:    Acute appendicitis with localized peritonitis, without perforation, abscess, or gangrene  Mr. Sinclair is a 44 year old male with PMH sig for HTN, HLD, who presents with abdominal pain.  Admitted from [facility] for acute appendicitis. S/p laparoscopic appendectomy 3/20/24.  culture growing Streptococcus constellatus. ID consulted.      Perforated appendicitis   Leukocytosis  Low grade fevers  Post op Ileus    - RLQ pain, with nausea   - Surgery consulted  - POD# 4 S/p laparoscopic appendectomy 3/20/24, perforated appendicitis with purulent fluid in pelvis, extensive inflammatory reaction involving base of cecum and mesoappendix, partial cecectomy   - IV zosyn  - pain, nausea control   - f/u cx with Streptococcus constellatus  - ID consulted   - advance diet   - diet changed to CLD  - self induced vomiting on 3/23, feels better today, no  nausea, less distended, has gas and bowel movements  - HR improved, WBC improved, no fevers     Hyponatremia - improved  - likely from decreased PO intake for the last 3 days   - 0.9  - trend Na      HTN   - amlodipine, lisinopril   - BP elevated but also with pain and IVF, will hold on titration of meds given op BP appropriate     HLD    - not on statin (diet and exercise)      FN:  - IVF: 0.9  - Diet: ADAT     DVT Prophy: SCDs, heparin, ambulate  Lines: Piv     Dispo: pending clinical course     Outpatient records or previous hospital records reviewed.      Further recommendations pending patient's clinical course.  [facility] hospitalist to continue to follow patient while in house     Patient and/or patient's family given opportunity to ask questions and note understanding and agreeing with therapeutic plan as outlined     Thank You,  Oj Domingo MD     [facility] Hospitalist  Answering Service number: [phone]     Subjective:     No CP, SOB, or  N/V.  Self induced vomiting last night, feels better today, abd less distended, passing flatus, having BM  Hiccups improved      OBJECTIVE:    Blood pressure (!) 143/92, pulse 97, temperature 97.7 °F (36.5 °C), temperature source Temporal, resp. rate 16, height 5' 11\" (1.803 m), weight 202 lb 9.6 oz (91.9 kg), SpO2 98%.    Temp:  [97.7 °F (36.5 °C)-99.1 °F (37.3 °C)] 97.7 °F (36.5 °C)  Pulse:  [] 97  Resp:  [16-18] 16  BP: (140-161)/() 143/92  SpO2:  [95 %-98 %] 98 %      Intake/Output:    Intake/Output Summary (Last 24 hours) at 3/24/2024 0929  Last data filed at 3/24/2024 0610  Gross per 24 hour   Intake 1798 ml   Output 4 ml   Net 1794 ml         Last 3 Weights   03/19/24 2215 202 lb 9.6 oz (91.9 kg)   03/19/24 2011 200 lb (90.7 kg)   03/21/23 1138 200 lb (90.7 kg)   03/20/23 0806 200 lb (90.7 kg)       Exam   General: Alert, no acute distress  HEENT: oral mucosa normal   Neck: non tender, no adenopathy   Lungs: clear to ausculation bilaterally  Heart: Regular rate and rhythm  Abdomen: soft, tender, less distended, incision sites c/d/i, bowel sounds diminished  Extremities: left arm swelling improved  Skin: no new rash, normal color  Neuro: 5/5 strength in bilateral extremities, normal sensations  Psych: appropriate affect     Data Review:       Labs:     Recent Labs   Lab 03/22/24  1219 03/23/24  0510 03/24/24  0557   RBC 4.80 4.54 4.09*   HGB 14.8 13.6 12.4*   HCT 40.9 39.0 34.7*   MCV 85.2 85.9 84.8   MCH 30.8 30.0 30.3   MCHC 36.2 34.9 35.7   RDW 12.3 12.6 12.5   NEPRELIM 18.22* 14.88* 11.51*   WBC 22.1* 18.8* 16.2*   .0 259.0 269.0         Recent Labs   Lab 03/21/24  0508 03/23/24  0510 03/24/24  0557   * 115* 116*   BUN 16 18 15   CREATSERUM 1.08 0.97 0.82   EGFRCR 87 99 112   CA 9.5 9.2 9.0   * 133* 135*   K 4.1 3.3* 3.5  3.5    103 104   CO2 23.0 26.0 23.0       Recent Labs   Lab 03/21/24  0508   ALT 14   AST 13   ALB 4.5         Imaging:  No results  found.      Meds:      heparin  5,000 Units Subcutaneous Q8H    metoclopramide  10 mg Oral TID AC    piperacillin-tazobactam  3.375 g Intravenous Q8H    lisinopril  40 mg Oral Daily    amLODIPine  10 mg Oral Daily      sodium chloride 50 mL/hr at 03/23/24 1536     hydrALAZINE, acetaminophen, traMADol, [DISCONTINUED] morphINE **OR** morphINE **OR** [DISCONTINUED] morphINE, baclofen, oxyCODONE, ondansetron, polyethylene glycol (PEG 3350), sennosides, bisacodyl

## 2024-03-24 NOTE — PROGRESS NOTES
[facility]  part of [facility]    Progress Note    Gómez Sinclair Patient Status:  Observation    1980 MRN [id number]   Location [facility] 4W/SW/SE Attending Zan Sebastian MD   Hosp Day # 2 PCP Austin Castro MD        Subjective:   Gómez Sinclair is a(n) 44 year old male     POD#3 s/p laparoscopic appendectomy and drainage of intraperitoneal abscess  He still feels bloated, belching  Low grade temps have resolved  Feels bloated and belching frequently  Several BMs (yesterday more formed and looser again today), +flatus  Ambulating and voiding              Allergies/Medications:   Allergies: No Known Allergies   [COMPLETED] potassium chloride (K-Dur) tab 40 mEq  40 mEq Oral Once    heparin (Porcine) 5000 UNIT/ML injection 5,000 Units  5,000 Units Subcutaneous Q8H    sodium chloride 0.9% infusion   Intravenous Continuous    hydrALAZINE (Apresoline) tab 25 mg  25 mg Oral Q8H PRN    acetaminophen (Tylenol Extra Strength) tab 1,000 mg  1,000 mg Oral Q6H PRN    traMADol (Ultram) tab 50 mg  50 mg Oral Q6H PRN    morphINE PF 2 MG/ML injection 2 mg  2 mg Intravenous Q4H PRN    baclofen (Lioresal) tab 5 mg  5 mg Oral TID PRN    metoclopramide (Reglan) tab 10 mg  10 mg Oral TID AC    piperacillin-tazobactam (Zosyn) 3.375 g in dextrose 5% 100 mL IVPB-ADDV  3.375 g Intravenous Q8H    lisinopril (Prinivil; Zestril) tab 40 mg  40 mg Oral Daily    oxyCODONE immediate release tab 5 mg  5 mg Oral Q4H PRN    [COMPLETED] iopamidol 76% (ISOVUE-370) injection for power injector  85 mL Intravenous ONCE PRN    [COMPLETED] cefTRIAXone (Rocephin) 1 g in D5W 100 mL IVPB-ADD  1 g Intravenous Once    [COMPLETED] metRONIDAZOLE in sodium chloride 0.79% (Flagyl) 5 mg/mL IVPB premix 500 mg  500 mg Intravenous Once    [COMPLETED] morphINE PF 4 MG/ML injection 4 mg  4 mg Intravenous Once    [] sodium chloride 0.9% infusion   Intravenous Continuous    [COMPLETED] sodium chloride 0.9% infusion    Intravenous Once    ondansetron (Zofran) 4 MG/2ML injection 4 mg  4 mg Intravenous Q6H PRN    polyethylene glycol (PEG 3350) (Miralax) 17 g oral packet 17 g  17 g Oral Daily PRN    sennosides (Senokot) tab 17.2 mg  17.2 mg Oral Nightly PRN    bisacodyl (Dulcolax) 10 MG rectal suppository 10 mg  10 mg Rectal Daily PRN    amLODIPine (Norvasc) tab 10 mg  10 mg Oral Daily           Objective:   Vital Signs:  Tm 100.9  Blood pressure (!) 161/109, pulse 106, temperature 98.7 °F (37.1 °C), temperature source Oral, resp. rate 18, height 5' 11\" (1.803 m), weight 202 lb 9.6 oz (91.9 kg), SpO2 95%.     I/O last 3 completed shifts:  In: 452 [I.V.:452]  Out: -     General: No acute distress. Alert and oriented x 3.  HEENT: Moist mucous membranes. EOM-I. PERRL  Neck: No lymphadenopathy.  No JVD. No carotid bruits.  Respiratory: Clear to auscultation bilaterally.  No wheezes. No rhonchi.  Cardiovascular: S1, S2.  Regular rate and rhythm.  No murmurs. Equal pulses   Abdomen: Soft, appropriate incisional tenderness, moderately distended.  Positive bowel sounds. No rebound tenderness  Incision(s): C/D/I x 4    Neurologic: No focal neurological deficits.  Musculoskeletal: Full range of motion of all extremities.  No swelling noted.  Integument: No lesions. No erythema.  Psychiatric: Appropriate mood and affect.        Assessment and Plan:     Acute appendicitis with localized peritonitis, without perforation, abscess, or gangrene  POD#3 s/p laparoscopic appendectomy and drainage intraperitoneal abscess    Low grade fevers resolved  Significant leukocytosis, slowly declining (18.8k today, 22k yesterday)  Intra-op culture = Strep constellatus    Postop ileus persists despite some evidence of recovery of bowel function   Remains distended and frequent belching despite flatus and some BMs    Advise  Allow diet as tolerated, patient self-limiting while feeling bloated  Continue IV antibiotics, now on Zosyn per ID            Results:      Lab Results   Component Value Date    WBC 18.8 (H) 03/23/2024    HGB 13.6 03/23/2024    HCT 39.0 03/23/2024    .0 03/23/2024    CREATSERUM 0.97 03/23/2024    BUN 18 03/23/2024     (L) 03/23/2024    K 3.3 (L) 03/23/2024     03/23/2024    CO2 26.0 03/23/2024     (H) 03/23/2024    CA 9.2 03/23/2024    ALB 4.5 03/21/2024    ALKPHO 58 03/21/2024    BILT 1.2 03/21/2024    TP 7.6 03/21/2024    AST 13 03/21/2024    ALT 14 03/21/2024    TSH 1.080 03/21/2023    CRP 4.50 (H) 03/23/2024    MG 2.2 03/23/2024     03/21/2023       No results found.                 Arnoldo Diaz MD  3/23/2024

## 2024-03-24 NOTE — PLAN OF CARE
Patient AO x4. Ambulates self. IVF running. Voiding freely. V/S stable. Denies pain. It is more bloating. Had soft bowel movement today. Heparin for DVT prophylaxis. Call light within reach. Fall precautions. Plan for home once medically cleared.       Problem: Patient Centered Care  Goal: Patient preferences are identified and integrated in the patient's plan of care  Description: Interventions:  - What would you like us to know as we care for you? From home with wife  - Provide timely, complete, and accurate information to patient/family  - Incorporate patient and family knowledge, values, beliefs, and cultural backgrounds into the planning and delivery of care  - Encourage patient/family to participate in care and decision-making at the level they choose  - Honor patient and family perspectives and choices  Outcome: Progressing     Problem: Patient/Family Goals  Goal: Patient/Family Long Term Goal  Description: Patient's Long Term Goal: go home    Interventions:  - diagnostics  -medications  -general surgery  - See additional Care Plan goals for specific interventions  Outcome: Progressing  Goal: Patient/Family Short Term Goal  Description: Patient's Short Term Goal: no more hiccups    Interventions:   - medications  - See additional Care Plan goals for specific interventions  Outcome: Progressing     Problem: PAIN - ADULT  Goal: Verbalizes/displays adequate comfort level or patient's stated pain goal  Description: INTERVENTIONS:  - Encourage pt to monitor pain and request assistance  - Assess pain using appropriate pain scale  - Administer analgesics based on type and severity of pain and evaluate response  - Implement non-pharmacological measures as appropriate and evaluate response  - Consider cultural and social influences on pain and pain management  - Manage/alleviate anxiety  - Utilize distraction and/or relaxation techniques  - Monitor for opioid side effects  - Notify MD/LIP if interventions unsuccessful  or patient reports new pain  - Anticipate increased pain with activity and pre-medicate as appropriate  Outcome: Progressing     Problem: DISCHARGE PLANNING  Goal: Discharge to home or other facility with appropriate resources  Description: INTERVENTIONS:  - Identify barriers to discharge w/pt and caregiver  - Include patient/family/discharge partner in discharge planning  - Arrange for needed discharge resources and transportation as appropriate  - Identify discharge learning needs (meds, wound care, etc)  - Arrange for interpreters to assist at discharge as needed  - Consider post-discharge preferences of patient/family/discharge partner  - Complete POLST form as appropriate  - Assess patient's ability to be responsible for managing their own health  - Refer to Case Management Department for coordinating discharge planning if the patient needs post-hospital services based on physician/LIP order or complex needs related to functional status, cognitive ability or social support system  Outcome: Progressing     Problem: RISK FOR INFECTION - ADULT  Goal: Absence of fever/infection during anticipated neutropenic period  Description: INTERVENTIONS  - Monitor WBC  - Administer growth factors as ordered  - Implement neutropenic guidelines  Outcome: Progressing     Problem: Integumentary status not within defined limits  Goal: Pt's integumentary status will be adequate for discharge  Outcome: Progressing

## 2024-03-24 NOTE — PROGRESS NOTES
[facility]  part of [facility]    Progress Note    Gómez Sinclair Patient Status:  Observation    1980 MRN [id number]   Location [facility] 4W/SW/SE Attending Zan Sebastian MD   Hosp Day # 3 PCP Austin Castro MD        Subjective:   Gómez Sinclair is a(n) 44 year old male     POD#4 s/p laparoscopic appendectomy and drainage of intraperitoneal abscess  He self-induced emesis last night, then passed more flatus.     Bloating and belching improved although he is still distended  Several BMs (yesterday and again today)  Ambulating and voiding              Allergies/Medications:   Allergies: No Known Allergies   [COMPLETED] potassium chloride (K-Dur) tab 40 mEq  40 mEq Oral Once    heparin (Porcine) 5000 UNIT/ML injection 5,000 Units  5,000 Units Subcutaneous Q8H    sodium chloride 0.9% infusion   Intravenous Continuous    hydrALAZINE (Apresoline) tab 25 mg  25 mg Oral Q8H PRN    acetaminophen (Tylenol Extra Strength) tab 1,000 mg  1,000 mg Oral Q6H PRN    traMADol (Ultram) tab 50 mg  50 mg Oral Q6H PRN    morphINE PF 2 MG/ML injection 2 mg  2 mg Intravenous Q4H PRN    baclofen (Lioresal) tab 5 mg  5 mg Oral TID PRN    metoclopramide (Reglan) tab 10 mg  10 mg Oral TID AC    piperacillin-tazobactam (Zosyn) 3.375 g in dextrose 5% 100 mL IVPB-ADDV  3.375 g Intravenous Q8H    lisinopril (Prinivil; Zestril) tab 40 mg  40 mg Oral Daily    oxyCODONE immediate release tab 5 mg  5 mg Oral Q4H PRN    [COMPLETED] iopamidol 76% (ISOVUE-370) injection for power injector  85 mL Intravenous ONCE PRN    [COMPLETED] cefTRIAXone (Rocephin) 1 g in D5W 100 mL IVPB-ADD  1 g Intravenous Once    [COMPLETED] metRONIDAZOLE in sodium chloride 0.79% (Flagyl) 5 mg/mL IVPB premix 500 mg  500 mg Intravenous Once    [COMPLETED] morphINE PF 4 MG/ML injection 4 mg  4 mg Intravenous Once    [] sodium chloride 0.9% infusion   Intravenous Continuous    [COMPLETED] sodium chloride 0.9% infusion    Intravenous Once    ondansetron (Zofran) 4 MG/2ML injection 4 mg  4 mg Intravenous Q6H PRN    polyethylene glycol (PEG 3350) (Miralax) 17 g oral packet 17 g  17 g Oral Daily PRN    sennosides (Senokot) tab 17.2 mg  17.2 mg Oral Nightly PRN    bisacodyl (Dulcolax) 10 MG rectal suppository 10 mg  10 mg Rectal Daily PRN    amLODIPine (Norvasc) tab 10 mg  10 mg Oral Daily           Objective:   Vital Signs:  Tm 100.9  Blood pressure (!) 136/93, pulse 97, temperature 98.5 °F (36.9 °C), temperature source Oral, resp. rate 17, height 5' 11\" (1.803 m), weight 202 lb 9.6 oz (91.9 kg), SpO2 99%.     I/O last 3 completed shifts:  In: 1798 [P.O.:400; I.V.:1398]  Out: 4 [Emesis/NG output:4]    General: No acute distress. Alert and oriented x 3.  HEENT: Moist mucous membranes. EOM-I. PERRL  Neck: No lymphadenopathy.  No JVD. No carotid bruits.  Respiratory: Clear to auscultation bilaterally.  No wheezes. No rhonchi.  Cardiovascular: S1, S2.  Regular rate and rhythm.  No murmurs. Equal pulses   Abdomen: Soft, appropriate incisional tenderness, moderately distended.  Positive bowel sounds. No rebound tenderness  Incision(s): C/D/I x 4    Neurologic: No focal neurological deficits.  Musculoskeletal: Full range of motion of all extremities.  No swelling noted.  Integument: No lesions. No erythema.  Psychiatric: Appropriate mood and affect.        Assessment and Plan:     Acute appendicitis with localized peritonitis, without perforation, abscess, or gangrene  POD#4 s/p laparoscopic appendectomy and drainage intraperitoneal abscess    Low grade fevers resolved  Significant leukocytosis, slowly declining (16.2k today, 18.8k yesterday)  Intra-op culture = Strep constellatus    Postop ileus persists despite some evidence of recovery of bowel function   Remains distended but frequent belching and bloating improved and he is passing flatus and BMs    Advise  Allow diet as tolerated, patient self-limiting while feeling bloated  Continue IV  antibiotics, now on Zosyn per ID            Results:     Lab Results   Component Value Date    WBC 16.2 (H) 03/24/2024    HGB 12.4 (L) 03/24/2024    HCT 34.7 (L) 03/24/2024    .0 03/24/2024    CREATSERUM 0.82 03/24/2024    BUN 15 03/24/2024     (L) 03/24/2024    K 3.5 03/24/2024    K 3.5 03/24/2024     03/24/2024    CO2 23.0 03/24/2024     (H) 03/24/2024    CA 9.0 03/24/2024    ALB 4.5 03/21/2024    ALKPHO 58 03/21/2024    BILT 1.2 03/21/2024    TP 7.6 03/21/2024    AST 13 03/21/2024    ALT 14 03/21/2024    TSH 1.080 03/21/2023    CRP 4.50 (H) 03/23/2024    MG 2.2 03/24/2024     03/21/2023       No results found.                 Arnoldo Diaz MD  3/24/2024

## 2024-03-25 VITALS
SYSTOLIC BLOOD PRESSURE: 132 MMHG | HEIGHT: 71 IN | RESPIRATION RATE: 18 BRPM | DIASTOLIC BLOOD PRESSURE: 82 MMHG | BODY MASS INDEX: 28 KG/M2 | OXYGEN SATURATION: 99 % | TEMPERATURE: 99 F | WEIGHT: 200 LBS | HEART RATE: 92 BPM

## 2024-03-25 LAB
ANION GAP SERPL CALC-SCNC: 9 MMOL/L (ref 0–18)
BASOPHILS # BLD AUTO: 0.06 X10(3) UL (ref 0–0.2)
BASOPHILS NFR BLD AUTO: 0.5 %
BUN BLD-MCNC: 13 MG/DL (ref 9–23)
BUN/CREAT SERPL: 13.4 (ref 10–20)
CALCIUM BLD-MCNC: 8.6 MG/DL (ref 8.7–10.4)
CHLORIDE SERPL-SCNC: 108 MMOL/L (ref 98–112)
CO2 SERPL-SCNC: 22 MMOL/L (ref 21–32)
CREAT BLD-MCNC: 0.97 MG/DL
DEPRECATED RDW RBC AUTO: 40.8 FL (ref 35.1–46.3)
EGFRCR SERPLBLD CKD-EPI 2021: 99 ML/MIN/1.73M2 (ref 60–?)
EOSINOPHIL # BLD AUTO: 0.32 X10(3) UL (ref 0–0.7)
EOSINOPHIL NFR BLD AUTO: 2.6 %
ERYTHROCYTE [DISTWIDTH] IN BLOOD BY AUTOMATED COUNT: 12.5 % (ref 11–15)
GLUCOSE BLD-MCNC: 98 MG/DL (ref 70–99)
HCT VFR BLD AUTO: 33.8 %
HGB BLD-MCNC: 11.7 G/DL
IMM GRANULOCYTES # BLD AUTO: 0.45 X10(3) UL (ref 0–1)
IMM GRANULOCYTES NFR BLD: 3.7 %
LYMPHOCYTES # BLD AUTO: 2.73 X10(3) UL (ref 1–4)
LYMPHOCYTES NFR BLD AUTO: 22.6 %
MAGNESIUM SERPL-MCNC: 2.2 MG/DL (ref 1.6–2.6)
MCH RBC QN AUTO: 30.7 PG (ref 26–34)
MCHC RBC AUTO-ENTMCNC: 34.6 G/DL (ref 31–37)
MCV RBC AUTO: 88.7 FL
MONOCYTES # BLD AUTO: 1.29 X10(3) UL (ref 0.1–1)
MONOCYTES NFR BLD AUTO: 10.7 %
NEUTROPHILS # BLD AUTO: 7.23 X10 (3) UL (ref 1.5–7.7)
NEUTROPHILS # BLD AUTO: 7.23 X10(3) UL (ref 1.5–7.7)
NEUTROPHILS NFR BLD AUTO: 59.9 %
OSMOLALITY SERPL CALC.SUM OF ELEC: 288 MOSM/KG (ref 275–295)
PLATELET # BLD AUTO: 244 10(3)UL (ref 150–450)
POTASSIUM SERPL-SCNC: 3.9 MMOL/L (ref 3.5–5.1)
RBC # BLD AUTO: 3.81 X10(6)UL
SODIUM SERPL-SCNC: 139 MMOL/L (ref 136–145)
WBC # BLD AUTO: 12.1 X10(3) UL (ref 4–11)

## 2024-03-25 PROCEDURE — 85025 COMPLETE CBC W/AUTO DIFF WBC: CPT | Performed by: HOSPITALIST

## 2024-03-25 PROCEDURE — 80048 BASIC METABOLIC PNL TOTAL CA: CPT | Performed by: HOSPITALIST

## 2024-03-25 PROCEDURE — 83735 ASSAY OF MAGNESIUM: CPT | Performed by: HOSPITALIST

## 2024-03-25 RX ORDER — AMOXICILLIN AND CLAVULANATE POTASSIUM 875; 125 MG/1; MG/1
1 TABLET, FILM COATED ORAL 2 TIMES DAILY
Qty: 20 TABLET | Refills: 0 | Status: SHIPPED | OUTPATIENT
Start: 2024-03-26 | End: 2024-04-05

## 2024-03-25 NOTE — PROGRESS NOTES
[facility] Hospitalist Progress Note     CC: Hospital Follow up    PCP: Austin Castro MD       Assessment/Plan:     Principal Problem:    Acute appendicitis with localized peritonitis, without perforation, abscess, or gangrene  Mr. Sinclair is a 44 year old male with PMH sig for HTN, HLD, who presents with abdominal pain.  Admitted from [facility] for acute appendicitis. S/p laparoscopic appendectomy 3/20/24.  culture growing Streptococcus constellatus. ID consulted, changed to zosyn with clinical improvement, post op ileus improved, tolerating diet, passing flatus, vitals improved, plan for poss DC home if tolerating dinner.       Perforated appendicitis   Leukocytosis  Low grade fevers  Post op Ileus    - RLQ pain, with nausea   - Surgery consulted  - POD# 5 S/p laparoscopic appendectomy 3/20/24, perforated appendicitis with purulent fluid in pelvis, extensive inflammatory reaction involving base of cecum and mesoappendix, partial cecectomy   - IV zosyn---> plan for augmentin on DC  - pain, nausea control   - f/u cx with Streptococcus constellatus  - ID consulted   - advance diet   - HR improved, WBC improved, BP improved, abd distention improved, hiccups improved, no nausea or vomiting,   - adv diet to soft    Hyponatremia -resolved  - likely from decreased PO intake for the last 3 days        HTN   - amlodipine, lisinopril   - BP now resolved    HLD    - not on statin (diet and exercise)      FN:  - IVF: stop  - Diet: ADAT     DVT Prophy: SCDs, heparin, ambulate  Lines: Piv     Dispo: pending clinical course     Outpatient records or previous hospital records reviewed.      Further recommendations pending patient's clinical course.  [facility] hospitalist to continue to follow patient while in house     Patient and/or patient's family given opportunity to ask questions and note understanding and agreeing with therapeutic plan as outlined     Thank You,  Oj Domingo MD     [facility]  Answering Service number:  [phone]     Subjective:     No CP, SOB, or N/V.  Feeling much better, abd pain improved, no nausea or vomiting, no CP or sob, passing flatus,       OBJECTIVE:    Blood pressure 134/84, pulse 83, temperature 97.8 °F (36.6 °C), temperature source Oral, resp. rate 16, height 5' 11\" (1.803 m), weight 200 lb (90.7 kg), SpO2 100%.    Temp:  [97.8 °F (36.6 °C)-98.8 °F (37.1 °C)] 97.8 °F (36.6 °C)  Pulse:  [69-97] 83  Resp:  [16-18] 16  BP: (116-137)/(65-93) 134/84  SpO2:  [97 %-100 %] 100 %      Intake/Output:    Intake/Output Summary (Last 24 hours) at 3/25/2024 1516  Last data filed at 3/24/2024 1702  Gross per 24 hour   Intake 720 ml   Output --   Net 720 ml         Last 3 Weights   03/25/24 0803 200 lb (90.7 kg)   03/19/24 2215 202 lb 9.6 oz (91.9 kg)   03/19/24 2011 200 lb (90.7 kg)   03/21/23 1138 200 lb (90.7 kg)   03/20/23 0806 200 lb (90.7 kg)       Exam   General: Alert, no acute distress  HEENT: oral mucosa normal   Neck: non tender, no adenopathy   Lungs: clear to ausculation bilaterally  Heart: Regular rate and rhythm  Abdomen: soft, tender, minimally  distended, incision sites c/d/i, bowel sounds noted  Extremities: left arm swelling improved  Skin: no new rash, normal color  Neuro: 5/5 strength in bilateral extremities, normal sensations  Psych: appropriate affect     Data Review:       Labs:     Recent Labs   Lab 03/23/24  0510 03/24/24  0557 03/25/24  0611   RBC 4.54 4.09* 3.81*   HGB 13.6 12.4* 11.7*   HCT 39.0 34.7* 33.8*   MCV 85.9 84.8 88.7   MCH 30.0 30.3 30.7   MCHC 34.9 35.7 34.6   RDW 12.6 12.5 12.5   NEPRELIM 14.88* 11.51* 7.23   WBC 18.8* 16.2* 12.1*   .0 269.0 244.0         Recent Labs   Lab 03/23/24  0510 03/24/24  0557 03/25/24  0610   * 116* 98   BUN 18 15 13   CREATSERUM 0.97 0.82 0.97   EGFRCR 99 112 99   CA 9.2 9.0 8.6*   * 135* 139   K 3.3* 3.5  3.5 3.9    104 108   CO2 26.0 23.0 22.0       Recent Labs   Lab 03/21/24  0508   ALT 14   AST 13   ALB 4.5          Imaging:  No results found.      Meds:      heparin  5,000 Units Subcutaneous Q8H    metoclopramide  10 mg Oral TID AC    lisinopril  40 mg Oral Daily    amLODIPine  10 mg Oral Daily         hydrALAZINE, acetaminophen, traMADol, [DISCONTINUED] morphINE **OR** morphINE **OR** [DISCONTINUED] morphINE, baclofen, oxyCODONE, ondansetron, polyethylene glycol (PEG 3350), sennosides, bisacodyl

## 2024-03-25 NOTE — PROGRESS NOTES
Patient has all clearances from surgical, and medical to go home. Discharge went over with patient. Paper work given to the patient. Scripts given to patient.  All questions addressed.

## 2024-03-25 NOTE — PLAN OF CARE
Patient AO x4. Ambulates self. IVF running. Voiding freely. Had loose bowel movement. V/S stable. Denies pain. Per patient bloating is improving. Overall states feeling better. X4 lap sites FRANCO, no drainage. Heparin for DVT prophylaxis. Call light within reach. Fall precautions. Plan for home once medically cleared awaiting final ID recs.      Problem: Patient Centered Care  Goal: Patient preferences are identified and integrated in the patient's plan of care  Description: Interventions:  - What would you like us to know as we care for you? From home with wife  - Provide timely, complete, and accurate information to patient/family  - Incorporate patient and family knowledge, values, beliefs, and cultural backgrounds into the planning and delivery of care  - Encourage patient/family to participate in care and decision-making at the level they choose  - Honor patient and family perspectives and choices  Outcome: Progressing     Problem: Patient/Family Goals  Goal: Patient/Family Long Term Goal  Description: Patient's Long Term Goal: go home    Interventions:  - diagnostics  -medications  -general surgery  - See additional Care Plan goals for specific interventions  Outcome: Progressing  Goal: Patient/Family Short Term Goal  Description: Patient's Short Term Goal: no more hiccups    Interventions:   - medications  - See additional Care Plan goals for specific interventions  Outcome: Progressing     Problem: PAIN - ADULT  Goal: Verbalizes/displays adequate comfort level or patient's stated pain goal  Description: INTERVENTIONS:  - Encourage pt to monitor pain and request assistance  - Assess pain using appropriate pain scale  - Administer analgesics based on type and severity of pain and evaluate response  - Implement non-pharmacological measures as appropriate and evaluate response  - Consider cultural and social influences on pain and pain management  - Manage/alleviate anxiety  - Utilize distraction and/or relaxation  techniques  - Monitor for opioid side effects  - Notify MD/LIP if interventions unsuccessful or patient reports new pain  - Anticipate increased pain with activity and pre-medicate as appropriate  Outcome: Progressing     Problem: DISCHARGE PLANNING  Goal: Discharge to home or other facility with appropriate resources  Description: INTERVENTIONS:  - Identify barriers to discharge w/pt and caregiver  - Include patient/family/discharge partner in discharge planning  - Arrange for needed discharge resources and transportation as appropriate  - Identify discharge learning needs (meds, wound care, etc)  - Arrange for interpreters to assist at discharge as needed  - Consider post-discharge preferences of patient/family/discharge partner  - Complete POLST form as appropriate  - Assess patient's ability to be responsible for managing their own health  - Refer to Case Management Department for coordinating discharge planning if the patient needs post-hospital services based on physician/LIP order or complex needs related to functional status, cognitive ability or social support system  Outcome: Progressing     Problem: RISK FOR INFECTION - ADULT  Goal: Absence of fever/infection during anticipated neutropenic period  Description: INTERVENTIONS  - Monitor WBC  - Administer growth factors as ordered  - Implement neutropenic guidelines  Outcome: Progressing     Problem: Integumentary status not within defined limits  Goal: Pt's integumentary status will be adequate for discharge  Outcome: Progressing

## 2024-03-25 NOTE — DISCHARGE SUMMARY
General Medicine Discharge Summary     Patient ID:  Gómez Sinclair  44 year old  2/19/1980    Admit date: 3/19/2024    Discharge date and time: 3/25/2024    Attending Physician: Oj Domingo MD     Consults: IP CONSULT TO GENERAL SURGERY  NURSING CONSULT TO DIETITIAN  IP CONSULT TO INFECTIOUS DISEASE  IP CONSULT TO VASCULAR ACCESS TEAM    Primary Care Physician: Austin Castro MD     Reason for admission: acute appendicitis     Risk For Readmission: low     Discharge Diagnoses: Acute appendicitis with localized peritonitis, without perforation, abscess, or gangrene [K35.30]  See Additional Discharge Diagnoses in Hospital Course    Discharged Condition: good    Follow-up with labs/images appointments:       Other Discharge Instructions:         Keep al follow up appointments and continue current medications.     Please follow up with Dr shine and PCP in 1 week.    Please continue soft low fiber diet until cleared for regular diet per dr. shine        Exam  Gen: No acute distress  Pulm: Lungs clear, normal respiratory effort  CV: Heart with regular rate and rhythm  Abd: Abdomen soft,   EXT: no edema     HPI:   Per Dr. Sebastian:  History of Present Illness: Patient is a 44 year old male with PMH sig for HTN, HLD, who presents with abdominal pain. Pain stared in the RLQ 3 days ago without radiation but worsening over the last 3 days.  Has nausea but no vomiting or diarrhea or dysuria.  Denies any chest pain, shortness of breath, no fevers or chills, no headache or dizziness.  Wife, mother and father at bedside.      CT at [facility] with perforated appendicitis   In the ED afebrile and hypertensive   Leukocytosis        Hospital Course:   Mr. Sinclair is a 44 year old male with PMH sig for HTN, HLD, who presents with abdominal pain.  Admitted from [facility] for acute appendicitis. S/p laparoscopic appendectomy 3/20/24.  culture growing Streptococcus constellatus. ID consulted, changed to zosyn with clinical improvement, post op  ileus improved, tolerating diet, passing flatus, vitals improved, tolerating soft diet, cleared by surgery and ID for DC home fu with PCP and surgery on DC.        Perforated appendicitis   Leukocytosis  Low grade fevers  Post op Ileus    - POD# 5 S/p laparoscopic appendectomy 3/20/24, perforated appendicitis with purulent fluid in pelvis, extensive inflammatory reaction involving base of cecum and mesoappendix, partial cecectomy   - IV zosyn---> plan for augmentin on DC per infectious disease   - pain resolved   - f/u cx with Streptococcus constellatus  - ID consulted -> recommended zosyn then augmentin on DC  - HR improved, WBC improved, BP improved, abd distention improved, hiccups improved, no nausea or vomiting,   - adv diet to soft, tolerating this  - cleared by surgery for DC home fu with surgery on DC.       Hyponatremia -resolved  - likely from decreased PO intake, now improved      HTN   - amlodipine, lisinopril   - BP now back to normal     HLD    - not on statin (diet and exercise)     Operative Procedures: Procedure(s) (LRB):  LAPAROSCOPIC APPENDECTOMY (N/A)     Imaging: No results found.      Disposition: home    Activity: activity as tolerated  Diet: regular diet  Wound Care: as directed  Code Status: Full Code      Home Medication Changes:     Med list     Medication List        START taking these medications      amoxicillin clavulanate 875-125 MG Tabs  Commonly known as: Augmentin  Take 1 tablet by mouth 2 (two) times daily for 10 days.  Start taking on: March 26, 2024            CONTINUE taking these medications      amLODIPine 5 MG Tabs  Commonly known as: Norvasc     lisinopril 40 MG Tabs  Commonly known as: Prinivil; Zestril               Where to Get Your Medications        You can get these medications from any pharmacy    Bring a paper prescription for each of these medications  amoxicillin clavulanate 875-125 MG Tabs         FU   Follow-up Information       Arnoldo Diaz MD Follow up in 1  week(s).    Specialty: SURGERY, GENERAL  Contact information:  [address]  [address]  [facility] [id number] [phone]               Austin Castro MD. Schedule an appointment as soon as possible for a visit in 1 week(s).    Specialty: Family Medicine  Contact information:  [address]  [address]  [facility] [id number] [phone]                             DC instructions:      Other Discharge Instructions:         Keep al follow up appointments and continue current medications.     Please follow up with Dr shine and PCP in 1 week.    Please continue soft low fiber diet until cleared for regular diet per dr. shine        I reconciled current and discharge medications on day of discharge, discussed changes with patient and noted changes above.       Total Time Coordinating Care: Greater than 30 minutes    Patient had opportunity to ask questions and state understand and agree with therapeutic plan as outlined    Thank You,    Oj Domingo MD   Hospitalist with [facility]

## 2024-03-26 NOTE — PAYOR COMM NOTE
--------------  DISCHARGE REVIEW    Payor: [employer]/POS/EPO  Subscriber #:  [id number]  Authorization Number: [id number]    Admit date: 3/21/24  Admit time:   2:02 PM  Discharge Date: 3/25/2024  5:10 PM     Admitting Physician: Oj Domingo MD  Attending Physician:  No att. providers found  Primary Care Physician: Austin Castro MD          Discharge Summary Notes        Discharge Summary signed by Oj Domingo MD at 3/25/2024  4:23 PM       Author: Oj Domingo MD Specialty: HOSPITALIST, Internal Medicine Author Type: Physician    Filed: 3/25/2024  4:23 PM Date of Service: 3/25/2024  3:16 PM Status: Signed    : Oj Domingo MD (Physician)           General Medicine Discharge Summary     Patient ID:  Gómez Sinclair  44 year old  2/19/1980    Admit date: 3/19/2024    Discharge date and time: 3/25/2024    Attending Physician: Oj Domingo MD     Consults: IP CONSULT TO GENERAL SURGERY  NURSING CONSULT TO DIETITIAN  IP CONSULT TO INFECTIOUS DISEASE  IP CONSULT TO VASCULAR ACCESS TEAM    Primary Care Physician: Austin Castro MD     Reason for admission: acute appendicitis     Risk For Readmission: low     Discharge Diagnoses: Acute appendicitis with localized peritonitis, without perforation, abscess, or gangrene [K35.30]  See Additional Discharge Diagnoses in Hospital Course    Discharged Condition: good    Follow-up with labs/images appointments:       Other Discharge Instructions:         Keep al follow up appointments and continue current medications.     Please follow up with Dr shine and PCP in 1 week.    Please continue soft low fiber diet until cleared for regular diet per dr. shine        Exam  Gen: No acute distress  Pulm: Lungs clear, normal respiratory effort  CV: Heart with regular rate and rhythm  Abd: Abdomen soft,   EXT: no edema     HPI:   Per Dr. Sebastian:  History of Present Illness: Patient is a 44 year old male with PMH sig for HTN, HLD, who presents with abdominal pain. Pain stared  in the RLQ 3 days ago without radiation but worsening over the last 3 days.  Has nausea but no vomiting or diarrhea or dysuria.  Denies any chest pain, shortness of breath, no fevers or chills, no headache or dizziness.  Wife, mother and father at bedside.      CT at [facility] with perforated appendicitis   In the ED afebrile and hypertensive   Leukocytosis        Hospital Course:   Mr. Sinclair is a 44 year old male with PMH sig for HTN, HLD, who presents with abdominal pain.  Admitted from [facility] for acute appendicitis. S/p laparoscopic appendectomy 3/20/24.  culture growing Streptococcus constellatus. ID consulted, changed to zosyn with clinical improvement, post op ileus improved, tolerating diet, passing flatus, vitals improved, tolerating soft diet, cleared by surgery and ID for DC home fu with PCP and surgery on DC.        Perforated appendicitis   Leukocytosis  Low grade fevers  Post op Ileus    - POD# 5 S/p laparoscopic appendectomy 3/20/24, perforated appendicitis with purulent fluid in pelvis, extensive inflammatory reaction involving base of cecum and mesoappendix, partial cecectomy   - IV zosyn---> plan for augmentin on DC per infectious disease   - pain resolved   - f/u cx with Streptococcus constellatus  - ID consulted -> recommended zosyn then augmentin on DC  - HR improved, WBC improved, BP improved, abd distention improved, hiccups improved, no nausea or vomiting,   - adv diet to soft, tolerating this  - cleared by surgery for DC home fu with surgery on DC.       Hyponatremia -resolved  - likely from decreased PO intake, now improved      HTN   - amlodipine, lisinopril   - BP now back to normal     HLD    - not on statin (diet and exercise)     Operative Procedures: Procedure(s) (LRB):  LAPAROSCOPIC APPENDECTOMY (N/A)     Imaging: No results found.      Disposition: home    Activity: activity as tolerated  Diet: regular diet  Wound Care: as directed  Code Status: Full Code      Home Medication Changes:      Med list     Medication List        START taking these medications      amoxicillin clavulanate 875-125 MG Tabs  Commonly known as: Augmentin  Take 1 tablet by mouth 2 (two) times daily for 10 days.  Start taking on: March 26, 2024            CONTINUE taking these medications      amLODIPine 5 MG Tabs  Commonly known as: Norvasc     lisinopril 40 MG Tabs  Commonly known as: Prinivil; Zestril               Where to Get Your Medications        You can get these medications from any pharmacy    Bring a paper prescription for each of these medications  amoxicillin clavulanate 875-125 MG Tabs         FU   Follow-up Information       Arnoldo Diaz MD Follow up in 1 week(s).    Specialty: SURGERY, GENERAL  Contact information:  [address]  [address]  [facility] [address] [phone]               Austin Castro MD. Schedule an appointment as soon as possible for a visit in 1 week(s).    Specialty: Family Medicine  Contact information:  [address]  [address]  [facility] [id number] [phone]                             DC instructions:      Other Discharge Instructions:         Keep al follow up appointments and continue current medications.     Please follow up with Dr diaz and PCP in 1 week.    Please continue soft low fiber diet until cleared for regular diet per dr. diaz        I reconciled current and discharge medications on day of discharge, discussed changes with patient and noted changes above.       Total Time Coordinating Care: Greater than 30 minutes    Patient had opportunity to ask questions and state understand and agree with therapeutic plan as outlined    Thank You,    Oj Domingo MD   Hospitalist with [facility]         Electronically signed by Oj Domingo MD on 3/25/2024  4:23 PM         REVIEWER COMMENTS

## (undated) DEVICE — TROCAR: Brand: KII® SLEEVE

## (undated) DEVICE — Device: Brand: JELCO

## (undated) DEVICE — ELECTRODE ES 2.75IN PTFE BLDE MOD E-Z CLN

## (undated) DEVICE — [HIGH FLOW INSUFFLATOR,  DO NOT USE IF PACKAGE IS DAMAGED,  KEEP DRY,  KEEP AWAY FROM SUNLIGHT,  PROTECT FROM HEAT AND RADIOACTIVE SOURCES.]: Brand: PNEUMOSURE

## (undated) DEVICE — UNDYED BRAIDED (POLYGLACTIN 910), SYNTHETIC ABSORBABLE SUTURE: Brand: COATED VICRYL

## (undated) DEVICE — SUTURE VCRL 0 L27IN ABSRB VLT L26MM UR-6 5/8

## (undated) DEVICE — SOLUTION IRRIG 1000ML 0.9% NACL USP BTL

## (undated) DEVICE — POWER SHELL: Brand: SIGNIA

## (undated) DEVICE — LAPAROVUE VISIBILITY SYSTEM LAPAROSCOPIC SOLUTIONS: Brand: LAPAROVUE

## (undated) DEVICE — ENDOPATH ETS-FLEX45 ARTICULATING ENDOSCOPIC LINEAR CUTTER, NO RELOAD: Brand: ENDOPATH

## (undated) DEVICE — TROCAR: Brand: KII FIOS FIRST ENTRY

## (undated) DEVICE — GOWN SURG XL DISP LEV 3 AERO BLU RAGLAN SL

## (undated) DEVICE — PENCIL SMK EVAC L10FT MPLR BLDE JAW OPN

## (undated) DEVICE — DISPOSABLE GRASPER: Brand: EPIX LAPAROSCOPIC GRASPER

## (undated) DEVICE — ARTICULATION RELOAD WITH TRI-STAPLE TECHNOLOGY: Brand: ENDO GIA

## (undated) DEVICE — SOLUTION IRRIG 3000ML 0.9% NACL FLX CONT

## (undated) DEVICE — LAP CHOLE: Brand: MEDLINE INDUSTRIES, INC.

## (undated) DEVICE — TROCARS: Brand: KII® BALLOON BLUNT TIP SYSTEM

## (undated) DEVICE — CLIP APPLIER WITH CLIP LOGIC TECHNOLOGY: Brand: ENDO CLIP III

## (undated) DEVICE — TISSUE RETRIEVAL SYSTEM: Brand: INZII RETRIEVAL SYSTEM

## (undated) DEVICE — GAMMEX® PI HYBRID SIZE 7.5, STERILE POWDER-FREE SURGICAL GLOVE, POLYISOPRENE AND NEOPRENE BLEND: Brand: GAMMEX

## (undated) DEVICE — E-Z CLEAN, PTFE COATED, ELECTROSURGICAL LAPAROSCOPIC ELECTRODE, L-HOOK, 33 CM., SINGLE-USE, FOR USE WITH HAND CONTROL PENCIL: Brand: MEGADYNE

## (undated) DEVICE — 12 ML SYRINGE LUER-LOCK TIP: Brand: MONOJECT

## (undated) NOTE — LETTER
[facility]  [address], [place], [place]  Authorization for Surgical Operation and Procedure                                                                                           I hereby authorize Arnoldo Diaz MD, my physician and his/her assistants (if applicable), which may include medical students, residents, and/or fellows, to perform the following surgical operation/ procedure and administer such anesthesia as may be determined necessary by my physician: Operation/Procedure name (s) LAPAROSCOPIC APPENDECTOMY POSSIBLE OPEN on Gómez Sinclair   2.   I recognize that during the surgical operation/procedure, unforeseen conditions may necessitate additional or different procedures than those listed above.  I, therefore, further authorize and request that the above-named surgeon, assistants, or designees perform such procedures as are, in their judgment, necessary and desirable.    3.   My surgeon/physician has discussed prior to my surgery the potential benefits, risks and side effects of this procedure; the likelihood of achieving goals; and potential problems that might occur during recuperation.  They also discussed reasonable alternatives to the procedure, including risks, benefits, and side effects related to the alternatives and risks related to not receiving this procedure.  I have had all my questions answered and I acknowledge that no guarantee has been made as to the result that may be obtained.    4.   Should the need arise during my operation/procedure, which includes change of level of care prior to discharge, I also consent to the administration of blood and/or blood products.  Further, I understand that despite careful testing and screening of blood or blood products by collecting agencies, I may still be subject to ill effects as a result of receiving a blood transfusion and/or blood products.  The following are some, but not all, of the potential risks that can occur:  fever and allergic reactions, hemolytic reactions, transmission of diseases such as Hepatitis, AIDS and Cytomegalovirus (CMV) and fluid overload.  In the event that I wish to have an autologous transfusion of my own blood, or a directed donor transfusion, I will discuss this with my physician.  Check only if Refusing Blood or Blood Products  I understand refusal of blood or blood products as deemed necessary by my physician may have serious consequences to my condition to include possible death. I hereby assume responsibility for my refusal and release the hospital, its personnel, and my physicians from any responsibility for the consequences of my refusal.    o  Refuse   5.   I authorize the use of any specimen, organs, tissues, body parts or foreign objects that may be removed from my body during the operation/procedure for diagnosis, research or teaching purposes and their subsequent disposal by hospital authorities.  I also authorize the release of specimen test results and/or written reports to my treating physician on the hospital medical staff or other referring or consulting physicians involved in my care, at the discretion of the Pathologist or my treating physician.    6.   I consent to the photographing or videotaping of the operations or procedures to be performed, including appropriate portions of my body for medical, scientific, or educational purposes, provided my identity is not revealed by the pictures or by descriptive texts accompanying them.  If the procedure has been photographed/videotaped, the surgeon will obtain the original picture, image, videotape or CD.  The hospital will not be responsible for storage, release or maintenance of the picture, image, tape or CD.    7.   I consent to the presence of a  or observers in the operating room as deemed necessary by my physician or their designees.    8.   I recognize that in the event my procedure results in extended  X-Ray/fluoroscopy time, I may develop a skin reaction.    9. If I have a Do Not Attempt Resuscitation (DNAR) order in place, that status will be suspended while in the operating room, procedural suite, and during the recovery period unless otherwise explicitly stated by me (or a person authorized to consent on my behalf). The surgeon or my attending physician will determine when the applicable recovery period ends for purposes of reinstating the DNAR order.  10. Patients having a sterilization procedure: I understand that if the procedure is successful the results will be permanent and it will therefore be impossible for me to inseminate, conceive, or bear children.  I also understand that the procedure is intended to result in sterility, although the result has not been guaranteed.   11. I acknowledge that my physician has explained sedation/analgesia administration to me including the risk and benefits I consent to the administration of sedation/analgesia as may be necessary or desirable in the judgment of my physician.    I CERTIFY THAT I HAVE READ AND FULLY UNDERSTAND THE ABOVE CONSENT TO OPERATION and/or OTHER PROCEDURE.     _________________________________________ _________________________________     ___________________________________  Signature of Patient     Signature of Responsible Person                   Printed Name of Responsible Person                              _________________________________________ ______________________________        ___________________________________  Signature of Witness         Date  Time         Relationship to Patient    STATEMENT OF PHYSICIAN My signature below affirms that prior to the time of the procedure; I have explained to the patient and/or his/her legal representative, the risks and benefits involved in the proposed treatment and any reasonable alternative to the proposed treatment. I have also explained the risks and benefits involved in refusal of the  proposed treatment and alternatives to the proposed treatment and have answered the patient's questions. If I have a significant financial interest in a co-management agreement or a significant financial interest in any product or implant, or other significant relationship used in this procedure/surgery, I have disclosed this and had a discussion with my patient.     _______________________________________________________________ _____________________________  (Signature of Physician)                                                                                         (Date)                                   (Time)  Patient Name: Gómez GOMEZ Yahir    : 1980   Printed: 3/20/2024      Medical Record #: [id number]                                              Page 1 of 1

## (undated) NOTE — LETTER
[facility]  Administration of Anesthesia  Gómez AGEE agree to be cared for by a physician anesthesiologist alone and/or with a nurse anesthetist, who is specially trained to monitor me and give me medicine to put me to sleep or keep me comfortable during my procedure    I understand that my anesthesiologist and/or anesthetist is not an employee or agent of [facility] or [employer]. He or she works for [facility]    As the patient asking for anesthesia services, I agree to:  Allow the anesthesiologist (anesthesia doctor) to give me medicine and do additional procedures as necessary. Some examples are: Starting or using an “IV” to give me medicine, fluids or blood during my procedure, and having a breathing tube placed to help me breathe when I’m asleep (intubation). In the event that my heart stops working properly, I understand that my anesthesiologist will make every effort to sustain my life, unless otherwise directed by [facility] Do Not Resuscitate documents.  Tell my anesthesia doctor before my procedure:  If I am pregnant.  The last time that I ate or drank.  iii. All of the medicines I take (including prescriptions, herbal supplements, and pills I can buy without a prescription (including street drugs/illegal medications). Failure to inform my anesthesiologist about these medicines may increase my risk of anesthetic complications.  iv.If I am allergic to anything or have had a reaction to anesthesia before.  I understand how the anesthesia medicine will help me (benefits).  I understand that with any type of anesthesia medicine there are risks:  The most common risks are: nausea, vomiting, sore throat, muscle soreness, damage to my eyes, mouth, or teeth (from breathing tube placement).  Rare risks include: remembering what happened during my procedure, allergic reactions to medications, injury to my airway, heart, lungs, vision, nerves, or  muscles and in extremely rare instances death.  My doctor has explained to me other choices available to me for my care (alternatives).  Pregnant Patients (“epidural”):  I understand that the risks of having an epidural (medicine given into my back to help control pain during labor), include itching, low blood pressure, difficulty urinating, headache or slowing of the baby’s heart. Very rare risks include infection, bleeding, seizure, irregular heart rhythms and nerve injury.  Regional Anesthesia (“spinal”, “epidural”, & “nerve blocks”):  I understand that rare but potential complications include headache, bleeding, infection, seizure, irregular heart rhythms, and nerve injury.    _____________________________________________________________________________  Patient (or Representative) Signature/Relationship to Patient  Date   Time    _____________________________________________________________________________   Name (if used)    Language/Organization   Time    _____________________________________________________________________________  Nurse Anesthetist Signature     Date   Time  _____________________________________________________________________________  Anesthesiologist Signature     Date   Time  I have discussed the procedure and information above with the patient (or patient’s representative) and answered their questions. The patient or their representative has agreed to have anesthesia services.    _____________________________________________________________________________  Witness        Date   Time  I have verified that the signature is that of the patient or patient’s representative, and that it was signed before the procedure  Patient Name: Gómez Sinclair     : 1980                 Printed: 3/20/2024 at 10:09 AM    Medical Record #: [id number]                                            Page 1 of 1  ----------ANESTHESIA CONSENT----------

## (undated) NOTE — LETTER
Date & Time: 3/21/2023, 1:27 PM  Patient: Randall Sinclair  Encounter Provider(s):    Yohana Dickey MD       To Whom It May Concern:    Bessie Salamanca was seen and treated in our department on 3/21/2023. He should not return to work until 3/22/2023.     If you have any questions or concerns, please do not hesitate to call.        _____________________________  Physician/[facility] Signature